# Patient Record
Sex: MALE | Race: BLACK OR AFRICAN AMERICAN | NOT HISPANIC OR LATINO | Employment: PART TIME | URBAN - METROPOLITAN AREA
[De-identification: names, ages, dates, MRNs, and addresses within clinical notes are randomized per-mention and may not be internally consistent; named-entity substitution may affect disease eponyms.]

---

## 2019-01-02 ENCOUNTER — OFFICE VISIT (OUTPATIENT)
Dept: FAMILY MEDICINE CLINIC | Facility: CLINIC | Age: 34
End: 2019-01-02

## 2019-01-02 VITALS
DIASTOLIC BLOOD PRESSURE: 80 MMHG | SYSTOLIC BLOOD PRESSURE: 130 MMHG | HEIGHT: 76 IN | RESPIRATION RATE: 16 BRPM | OXYGEN SATURATION: 98 % | TEMPERATURE: 98.2 F | HEART RATE: 78 BPM | WEIGHT: 244 LBS | BODY MASS INDEX: 29.71 KG/M2

## 2019-01-02 DIAGNOSIS — L30.9 ECZEMA, UNSPECIFIED TYPE: ICD-10-CM

## 2019-01-02 DIAGNOSIS — L70.0 CYSTIC ACNE: Primary | ICD-10-CM

## 2019-01-02 DIAGNOSIS — L20.9 ATOPIC DERMATITIS, UNSPECIFIED TYPE: ICD-10-CM

## 2019-01-02 DIAGNOSIS — Z00.00 ENCOUNTER FOR MEDICAL EXAMINATION TO ESTABLISH CARE: ICD-10-CM

## 2019-01-02 PROBLEM — K21.9 ACID REFLUX: Status: ACTIVE | Noted: 2019-01-02

## 2019-01-02 PROBLEM — K44.9 BERGMANN'S SYNDROME: Status: ACTIVE | Noted: 2019-01-02

## 2019-01-02 PROBLEM — S69.90XA FINGER INJURY: Status: ACTIVE | Noted: 2019-01-02

## 2019-01-02 PROBLEM — F17.200 NICOTINE ADDICTION: Status: ACTIVE | Noted: 2019-01-02

## 2019-01-02 PROCEDURE — 99203 OFFICE O/P NEW LOW 30 MIN: CPT | Performed by: NURSE PRACTITIONER

## 2019-01-02 RX ORDER — TRIAMCINOLONE ACETONIDE 1 MG/G
CREAM TOPICAL
COMMUNITY
Start: 2015-04-15 | End: 2022-10-26

## 2019-01-02 RX ORDER — IBUPROFEN 800 MG/1
1 TABLET ORAL EVERY 8 HOURS
COMMUNITY
Start: 2015-06-11 | End: 2022-10-26

## 2019-01-02 NOTE — PROGRESS NOTES
"Beto Dye is a 33 y.o. male who presents to establish care and skin problems.    Chief Complaint   Patient presents with   • Establish Care       Patient states that he has had cystic acne, atopic dermatitis, and eczema for many years. He has seen a dermatologist but would like a referral to a new one for a new opinion. He has breakouts frequently and would like options for different treatment. He took Accutane which helped for a while and steroid creams which also helped some. He has no other complaints at this time.          History reviewed. No pertinent past medical history.    History reviewed. No pertinent surgical history.    History reviewed. No pertinent family history.    Social History     Socioeconomic History   • Marital status: Single     Spouse name: Not on file   • Number of children: Not on file   • Years of education: Not on file   • Highest education level: Not on file   Social Needs   • Financial resource strain: Not on file   • Food insecurity - worry: Not on file   • Food insecurity - inability: Not on file   • Transportation needs - medical: Not on file   • Transportation needs - non-medical: Not on file   Occupational History   • Not on file   Tobacco Use   • Smoking status: Current Every Day Smoker     Types: Cigarettes   • Smokeless tobacco: Never Used   Substance and Sexual Activity   • Alcohol use: Not on file   • Drug use: Not on file   • Sexual activity: Not on file   Other Topics Concern   • Not on file   Social History Narrative   • Not on file       No Known Allergies    ROS    Review of Systems   Skin: Positive for color change, rash and skin lesions.   Allergic/Immunologic: Positive for environmental allergies and food allergies.   All other systems reviewed and are negative.      Vitals:    01/02/19 0911   BP: 130/80   Pulse: 78   Resp: 16   Temp: 98.2 °F (36.8 °C)   SpO2: 98%   Weight: 111 kg (244 lb)   Height: 193 cm (76\")   PainSc: 0-No pain         Current Outpatient " Medications:   •  ibuprofen (ADVIL,MOTRIN) 800 MG tablet, Take 1 tablet by mouth Every 8 (Eight) Hours., Disp: , Rfl:   •  triamcinolone (KENALOG) 0.1 % cream, Apply  topically to the appropriate area as directed., Disp: , Rfl:     PE    Physical Exam   Constitutional: He is oriented to person, place, and time. He appears well-developed and well-nourished.   HENT:   Head: Normocephalic and atraumatic.   Cardiovascular: Normal rate, regular rhythm and normal heart sounds. Exam reveals no gallop and no friction rub.   No murmur heard.  Pulmonary/Chest: Effort normal and breath sounds normal. No stridor. No respiratory distress. He has no wheezes. He has no rales.   Musculoskeletal: Normal range of motion.   Neurological: He is alert and oriented to person, place, and time.   Skin: Skin is warm and dry. Capillary refill takes less than 2 seconds. Turgor is normal. Rash noted. Rash is pustular and urticarial.        Psychiatric: He has a normal mood and affect. His behavior is normal. Judgment and thought content normal.   Nursing note and vitals reviewed.       A/P    Beto was seen today for establish care.    Diagnoses and all orders for this visit:    Cystic acne  -     Ambulatory Referral to Dermatology    Encounter for medical examination to establish care    Atopic dermatitis, unspecified type  -     Ambulatory Referral to Dermatology  -     Ambulatory Referral to Allergy    Eczema, unspecified type  -     Ambulatory Referral to Dermatology  -     Ambulatory Referral to Allergy        Plan of care reviewed with patient at the conclusion of today's visit. Education was provided regarding diagnosis, management and any prescribed or recommended OTC medications.  Patient verbalizes understanding of and agreement with management plan.    Return in about 4 weeks (around 1/30/2019) for Annual with fasting labs, Recheck.     Rita Boothe, APRN

## 2019-01-30 ENCOUNTER — OFFICE VISIT (OUTPATIENT)
Dept: FAMILY MEDICINE CLINIC | Facility: CLINIC | Age: 34
End: 2019-01-30

## 2019-01-30 VITALS
TEMPERATURE: 98 F | DIASTOLIC BLOOD PRESSURE: 80 MMHG | RESPIRATION RATE: 18 BRPM | WEIGHT: 243.6 LBS | HEART RATE: 78 BPM | OXYGEN SATURATION: 98 % | SYSTOLIC BLOOD PRESSURE: 150 MMHG | HEIGHT: 76 IN | BODY MASS INDEX: 29.67 KG/M2

## 2019-01-30 DIAGNOSIS — R10.13 EPIGASTRIC PAIN: ICD-10-CM

## 2019-01-30 DIAGNOSIS — R53.83 FATIGUE, UNSPECIFIED TYPE: ICD-10-CM

## 2019-01-30 DIAGNOSIS — H61.23 BILATERAL IMPACTED CERUMEN: ICD-10-CM

## 2019-01-30 DIAGNOSIS — R73.09 ELEVATED GLUCOSE: ICD-10-CM

## 2019-01-30 DIAGNOSIS — E78.5 HYPERLIPIDEMIA, UNSPECIFIED HYPERLIPIDEMIA TYPE: ICD-10-CM

## 2019-01-30 DIAGNOSIS — I10 ESSENTIAL HYPERTENSION: ICD-10-CM

## 2019-01-30 DIAGNOSIS — Z23 NEED FOR HEPATITIS A IMMUNIZATION: Primary | ICD-10-CM

## 2019-01-30 DIAGNOSIS — Z23 NEED FOR TETANUS BOOSTER: ICD-10-CM

## 2019-01-30 DIAGNOSIS — K21.9 GASTROESOPHAGEAL REFLUX DISEASE WITHOUT ESOPHAGITIS: ICD-10-CM

## 2019-01-30 PROCEDURE — 99395 PREV VISIT EST AGE 18-39: CPT | Performed by: NURSE PRACTITIONER

## 2019-01-30 PROCEDURE — 90632 HEPA VACCINE ADULT IM: CPT | Performed by: NURSE PRACTITIONER

## 2019-01-30 PROCEDURE — 90471 IMMUNIZATION ADMIN: CPT | Performed by: NURSE PRACTITIONER

## 2019-01-30 PROCEDURE — 90472 IMMUNIZATION ADMIN EACH ADD: CPT | Performed by: NURSE PRACTITIONER

## 2019-01-30 PROCEDURE — 90715 TDAP VACCINE 7 YRS/> IM: CPT | Performed by: NURSE PRACTITIONER

## 2019-01-30 RX ORDER — BECLOMETHASONE DIPROPIONATE HFA 80 UG/1
AEROSOL, METERED RESPIRATORY (INHALATION)
Refills: 1 | COMMUNITY
Start: 2019-01-11 | End: 2022-10-26

## 2019-01-30 RX ORDER — OMEPRAZOLE 20 MG/1
20 CAPSULE, DELAYED RELEASE ORAL DAILY
Qty: 30 CAPSULE | Refills: 2 | Status: SHIPPED | OUTPATIENT
Start: 2019-01-30 | End: 2022-10-26

## 2019-01-30 RX ORDER — DOXYCYCLINE HYCLATE 100 MG/1
CAPSULE ORAL
Refills: 0 | COMMUNITY
Start: 2019-01-11 | End: 2019-02-27

## 2019-01-30 RX ORDER — CLINDAMYCIN PHOSPHATE 11.9 MG/ML
SOLUTION TOPICAL
Refills: 0 | COMMUNITY
Start: 2019-01-11 | End: 2022-10-26

## 2019-01-30 RX ORDER — INHALER,ASSIST DEVICE,LG MASK
SPACER (EA) MISCELLANEOUS SEE ADMIN INSTRUCTIONS
Refills: 0 | COMMUNITY
Start: 2019-01-11 | End: 2022-10-26

## 2019-01-30 RX ORDER — FLUTICASONE PROPIONATE 50 UG/1
SPRAY, METERED NASAL
Refills: 1 | COMMUNITY
Start: 2019-01-11 | End: 2022-10-26

## 2019-01-30 RX ORDER — TACROLIMUS 0.3 MG/G
OINTMENT TOPICAL
Refills: 0 | COMMUNITY
Start: 2019-01-14 | End: 2022-10-26

## 2019-01-30 RX ORDER — AMLODIPINE BESYLATE 5 MG/1
5 TABLET ORAL DAILY
Qty: 30 TABLET | Refills: 2 | Status: SHIPPED | OUTPATIENT
Start: 2019-01-30 | End: 2022-10-26

## 2019-01-31 LAB
ALBUMIN SERPL-MCNC: 4.73 G/DL (ref 3.2–4.8)
ALBUMIN/GLOB SERPL: 1.8 G/DL (ref 1.5–2.5)
ALP SERPL-CCNC: 68 U/L (ref 25–100)
ALT SERPL-CCNC: 51 U/L (ref 7–40)
AST SERPL-CCNC: 34 U/L (ref 0–33)
BASOPHILS # BLD AUTO: 0.02 10*3/MM3 (ref 0–0.2)
BASOPHILS NFR BLD AUTO: 0.4 % (ref 0–1)
BILIRUB SERPL-MCNC: 0.7 MG/DL (ref 0.3–1.2)
BUN SERPL-MCNC: 14 MG/DL (ref 9–23)
BUN/CREAT SERPL: 12.5 (ref 7–25)
CALCIUM SERPL-MCNC: 9.8 MG/DL (ref 8.7–10.4)
CHLORIDE SERPL-SCNC: 103 MMOL/L (ref 99–109)
CHOLEST SERPL-MCNC: 250 MG/DL (ref 0–200)
CHOLEST/HDLC SERPL: 6.58 {RATIO}
CO2 SERPL-SCNC: 28 MMOL/L (ref 20–31)
CREAT SERPL-MCNC: 1.12 MG/DL (ref 0.6–1.3)
EOSINOPHIL # BLD AUTO: 0.21 10*3/MM3 (ref 0–0.3)
EOSINOPHIL NFR BLD AUTO: 4.1 % (ref 0–3)
ERYTHROCYTE [DISTWIDTH] IN BLOOD BY AUTOMATED COUNT: 13.4 % (ref 11.3–14.5)
GLOBULIN SER CALC-MCNC: 2.7 GM/DL
GLUCOSE SERPL-MCNC: 89 MG/DL (ref 70–100)
H PYLORI IGA SER-ACNC: <9 UNITS (ref 0–8.9)
H PYLORI IGG SER IA-ACNC: 0.24 INDEX VALUE (ref 0–0.79)
H PYLORI IGM SER-ACNC: <9 UNITS (ref 0–8.9)
HCT VFR BLD AUTO: 52.4 % (ref 38.9–50.9)
HDLC SERPL-MCNC: 38 MG/DL (ref 40–60)
HGB BLD-MCNC: 17.6 G/DL (ref 13.1–17.5)
IMM GRANULOCYTES # BLD AUTO: 0.02 10*3/MM3 (ref 0–0.03)
IMM GRANULOCYTES NFR BLD AUTO: 0.4 % (ref 0–0.6)
LDLC SERPL CALC-MCNC: 185 MG/DL (ref 0–100)
LYMPHOCYTES # BLD AUTO: 2.01 10*3/MM3 (ref 0.6–4.8)
LYMPHOCYTES NFR BLD AUTO: 39.6 % (ref 24–44)
MCH RBC QN AUTO: 30.3 PG (ref 27–31)
MCHC RBC AUTO-ENTMCNC: 33.6 G/DL (ref 32–36)
MCV RBC AUTO: 90.3 FL (ref 80–99)
MONOCYTES # BLD AUTO: 0.61 10*3/MM3 (ref 0–1)
MONOCYTES NFR BLD AUTO: 12 % (ref 0–12)
NEUTROPHILS # BLD AUTO: 2.21 10*3/MM3 (ref 1.5–8.3)
NEUTROPHILS NFR BLD AUTO: 43.5 % (ref 41–71)
PLATELET # BLD AUTO: 306 10*3/MM3 (ref 150–450)
POTASSIUM SERPL-SCNC: 4.3 MMOL/L (ref 3.5–5.5)
PROT SERPL-MCNC: 7.4 G/DL (ref 5.7–8.2)
RBC # BLD AUTO: 5.8 10*6/MM3 (ref 4.2–5.76)
SODIUM SERPL-SCNC: 134 MMOL/L (ref 132–146)
TRIGL SERPL-MCNC: 137 MG/DL (ref 0–150)
TSH SERPL DL<=0.005 MIU/L-ACNC: 1.98 MIU/ML (ref 0.35–5.35)
VLDLC SERPL CALC-MCNC: 27.4 MG/DL
WBC # BLD AUTO: 5.08 10*3/MM3 (ref 3.5–10.8)

## 2019-02-22 PROBLEM — E78.49 OTHER HYPERLIPIDEMIA: Status: ACTIVE | Noted: 2019-02-22

## 2019-02-22 RX ORDER — ATORVASTATIN CALCIUM 20 MG/1
20 TABLET, FILM COATED ORAL NIGHTLY
Qty: 30 TABLET | Refills: 5 | Status: SHIPPED | OUTPATIENT
Start: 2019-02-22 | End: 2022-10-26

## 2019-02-27 ENCOUNTER — OFFICE VISIT (OUTPATIENT)
Dept: FAMILY MEDICINE CLINIC | Facility: CLINIC | Age: 34
End: 2019-02-27

## 2019-02-27 VITALS
RESPIRATION RATE: 16 BRPM | SYSTOLIC BLOOD PRESSURE: 132 MMHG | TEMPERATURE: 98.5 F | HEART RATE: 70 BPM | DIASTOLIC BLOOD PRESSURE: 90 MMHG | HEIGHT: 76 IN | WEIGHT: 248.2 LBS | OXYGEN SATURATION: 98 % | BODY MASS INDEX: 30.22 KG/M2

## 2019-02-27 DIAGNOSIS — J45.20 MILD INTERMITTENT ASTHMA, UNSPECIFIED WHETHER COMPLICATED: Primary | ICD-10-CM

## 2019-02-27 DIAGNOSIS — H66.93 BILATERAL ACUTE OTITIS MEDIA: ICD-10-CM

## 2019-02-27 DIAGNOSIS — J06.9 ACUTE URI: ICD-10-CM

## 2019-02-27 PROCEDURE — 99213 OFFICE O/P EST LOW 20 MIN: CPT | Performed by: NURSE PRACTITIONER

## 2019-02-27 RX ORDER — AMOXICILLIN AND CLAVULANATE POTASSIUM 875; 125 MG/1; MG/1
1 TABLET, FILM COATED ORAL 2 TIMES DAILY
Qty: 20 TABLET | Refills: 0 | Status: SHIPPED | OUTPATIENT
Start: 2019-02-27 | End: 2019-03-09

## 2019-02-27 RX ORDER — ALBUTEROL SULFATE 90 UG/1
2 AEROSOL, METERED RESPIRATORY (INHALATION) EVERY 4 HOURS PRN
Qty: 1 INHALER | Refills: 11 | Status: SHIPPED | OUTPATIENT
Start: 2019-02-27

## 2019-02-27 RX ORDER — CETIRIZINE HYDROCHLORIDE, PSEUDOEPHEDRINE HYDROCHLORIDE 5; 120 MG/1; MG/1
1 TABLET, FILM COATED, EXTENDED RELEASE ORAL 2 TIMES DAILY
Qty: 20 TABLET | Refills: 0 | Status: SHIPPED | OUTPATIENT
Start: 2019-02-27 | End: 2019-03-09

## 2019-02-27 NOTE — PROGRESS NOTES
Beto Dye is a 33 y.o. male who presents for ear pain and sore throat.    Chief Complaint   Patient presents with   • Ear Drainage       Ear Drainage    There is pain in both ears. This is a new problem. The current episode started in the past 7 days. The problem occurs constantly. The problem has been gradually worsening. There has been no fever. The pain is at a severity of 3/10. The pain is mild. Associated symptoms include rhinorrhea and a sore throat. He has tried NSAIDs and acetaminophen for the symptoms. The treatment provided mild relief.         History reviewed. No pertinent past medical history.    History reviewed. No pertinent surgical history.    Family History   Problem Relation Age of Onset   • Hypertension Mother        Social History     Socioeconomic History   • Marital status: Single     Spouse name: Not on file   • Number of children: Not on file   • Years of education: Not on file   • Highest education level: Not on file   Social Needs   • Financial resource strain: Not on file   • Food insecurity - worry: Not on file   • Food insecurity - inability: Not on file   • Transportation needs - medical: Not on file   • Transportation needs - non-medical: Not on file   Occupational History   • Not on file   Tobacco Use   • Smoking status: Current Some Day Smoker     Types: Cigarettes   • Smokeless tobacco: Never Used   Substance and Sexual Activity   • Alcohol use: No     Frequency: Never   • Drug use: No   • Sexual activity: Not on file   Other Topics Concern   • Not on file   Social History Narrative   • Not on file       No Known Allergies    ROS    Review of Systems   Constitutional: Positive for fatigue.   HENT: Positive for congestion, ear pain, postnasal drip, rhinorrhea and sore throat.    All other systems reviewed and are negative.      Vitals:    02/27/19 1514   BP: 132/90   Pulse: 70   Resp: 16   Temp: 98.5 °F (36.9 °C)   TempSrc: Oral   SpO2: 98%   Weight: 113 kg (248 lb 3.2 oz)  "  Height: 193 cm (76\")   PainSc: 0-No pain         Current Outpatient Medications:   •  amLODIPine (NORVASC) 5 MG tablet, Take 1 tablet by mouth Daily., Disp: 30 tablet, Rfl: 2  •  atorvastatin (LIPITOR) 20 MG tablet, Take 1 tablet by mouth Every Night., Disp: 30 tablet, Rfl: 5  •  clindamycin (CLEOCIN T) 1 % external solution, apply to affected area every morning, Disp: , Rfl: 0  •  GNP FLUTICASONE PROPIONATE 50 MCG/ACT nasal spray, instill 1 spray into each nostril once daily to twice a day, Disp: , Rfl: 1  •  ibuprofen (ADVIL,MOTRIN) 800 MG tablet, Take 1 tablet by mouth Every 8 (Eight) Hours., Disp: , Rfl:   •  omeprazole (PRILOSEC) 20 MG capsule, Take 1 capsule by mouth Daily., Disp: 30 capsule, Rfl: 2  •  QVAR REDIHALER 80 MCG/ACT inhaler, inhale 2 puffs by mouth twice a day WITH SPACER, Disp: , Rfl: 1  •  Spacer/Aero-Holding Chambers (AEROCHAMBER PLUS GAURANG-VU LARGE) misc, Inhale See Admin Instructions. with inhaler, Disp: , Rfl: 0  •  tacrolimus (PROTOPIC) 0.03 % ointment, , Disp: , Rfl: 0  •  triamcinolone (KENALOG) 0.1 % cream, Apply  topically to the appropriate area as directed., Disp: , Rfl:   •  albuterol sulfate  (90 Base) MCG/ACT inhaler, Inhale 2 puffs Every 4 (Four) Hours As Needed for Wheezing., Disp: 1 inhaler, Rfl: 11  •  amoxicillin-clavulanate (AUGMENTIN) 875-125 MG per tablet, Take 1 tablet by mouth 2 (Two) Times a Day for 10 days., Disp: 20 tablet, Rfl: 0  •  cetirizine-pseudoephedrine (ZyrTEC-D) 5-120 MG per 12 hr tablet, Take 1 tablet by mouth 2 (Two) Times a Day for 10 days., Disp: 20 tablet, Rfl: 0    PE    Physical Exam   Constitutional: He is oriented to person, place, and time. He appears well-developed and well-nourished.   HENT:   Head: Normocephalic and atraumatic.   Right Ear: External ear and ear canal normal. Tympanic membrane is erythematous and bulging. A middle ear effusion is present.   Left Ear: External ear and ear canal normal. Tympanic membrane is erythematous and " bulging. A middle ear effusion is present.   Nose: Mucosal edema, rhinorrhea and congestion present.   Mouth/Throat: Posterior oropharyngeal edema and posterior oropharyngeal erythema present. Tonsils are 2+ on the right. Tonsils are 2+ on the left.   Cardiovascular: Normal rate, regular rhythm and normal heart sounds. Exam reveals no gallop and no friction rub.   No murmur heard.  Pulmonary/Chest: Effort normal. No stridor. No respiratory distress. He has decreased breath sounds. He has no wheezes. He has no rales.   Musculoskeletal: Normal range of motion.   Neurological: He is alert and oriented to person, place, and time.   Skin: Skin is warm and dry. Capillary refill takes less than 2 seconds.   Psychiatric: He has a normal mood and affect. His behavior is normal. Judgment and thought content normal.   Nursing note and vitals reviewed.       A/P    Beto was seen today for ear drainage.    Diagnoses and all orders for this visit:    Mild intermittent asthma, unspecified whether complicated  -     albuterol sulfate  (90 Base) MCG/ACT inhaler; Inhale 2 puffs Every 4 (Four) Hours As Needed for Wheezing.    Bilateral acute otitis media  -     amoxicillin-clavulanate (AUGMENTIN) 875-125 MG per tablet; Take 1 tablet by mouth 2 (Two) Times a Day for 10 days.    Acute URI  -     cetirizine-pseudoephedrine (ZyrTEC-D) 5-120 MG per 12 hr tablet; Take 1 tablet by mouth 2 (Two) Times a Day for 10 days.        Plan of care reviewed with patient at the conclusion of today's visit. Education was provided regarding diagnosis, management and any prescribed or recommended OTC medications.  Patient verbalizes understanding of and agreement with management plan.    Return if symptoms worsen or fail to improve.     Rita Boothe, APRN

## 2019-07-01 RX ORDER — OMEPRAZOLE 20 MG/1
CAPSULE, DELAYED RELEASE ORAL
Qty: 30 CAPSULE | Refills: 2 | OUTPATIENT
Start: 2019-07-01

## 2019-07-01 RX ORDER — AMLODIPINE BESYLATE 5 MG/1
TABLET ORAL
Qty: 30 TABLET | Refills: 2 | OUTPATIENT
Start: 2019-07-01

## 2022-09-14 ENCOUNTER — TELEPHONE (OUTPATIENT)
Dept: PEDIATRICS | Facility: OTHER | Age: 37
End: 2022-09-14

## 2022-09-14 NOTE — TELEPHONE ENCOUNTER
Caller: Thompson Dye    Relationship to patient: Self    Best call back number: [phone]    Chief complaint:   PATIENT STATED THAT HE HAS BEEN HAVING ABDOMINAL PAIN WITH BLACK STOOL AND BRIGHT RED BLOOD IN STOOL. PATIENT WAS INFORMED TO GO TO THE ER OR CALL 911. PATIENT STATED THAT HE WAS GOING TO BE SEEN AT THE HOSPITAL     Patient directed to call 911 or go to their nearest emergency room.     Patient verbalized understanding: [x] Yes  [] No  If no, why?    NEW PATIENT NO PCP AT THIS TIME

## 2022-10-26 ENCOUNTER — OFFICE VISIT (OUTPATIENT)
Dept: FAMILY MEDICINE CLINIC | Facility: CLINIC | Age: 37
End: 2022-10-26

## 2022-10-26 VITALS
SYSTOLIC BLOOD PRESSURE: 130 MMHG | HEART RATE: 91 BPM | BODY MASS INDEX: 32.73 KG/M2 | WEIGHT: 268.8 LBS | OXYGEN SATURATION: 98 % | DIASTOLIC BLOOD PRESSURE: 68 MMHG | HEIGHT: 76 IN | TEMPERATURE: 98.7 F

## 2022-10-26 DIAGNOSIS — R10.9 ABDOMINAL WALL PAIN: ICD-10-CM

## 2022-10-26 DIAGNOSIS — K22.11 ULCER OF ESOPHAGUS WITH BLEEDING: ICD-10-CM

## 2022-10-26 DIAGNOSIS — F17.210 CIGARETTE NICOTINE DEPENDENCE WITHOUT COMPLICATION: ICD-10-CM

## 2022-10-26 DIAGNOSIS — K20.90 ESOPHAGITIS: ICD-10-CM

## 2022-10-26 PROBLEM — S69.90XA FINGER INJURY: Status: RESOLVED | Noted: 2019-01-02 | Resolved: 2022-10-26

## 2022-10-26 PROBLEM — J45.40 MODERATE PERSISTENT ASTHMA WITHOUT COMPLICATION: Status: ACTIVE | Noted: 2022-10-26

## 2022-10-26 LAB
BILIRUB BLD-MCNC: NEGATIVE MG/DL
CLARITY, POC: CLEAR
COLOR UR: YELLOW
EXPIRATION DATE: ABNORMAL
GLUCOSE UR STRIP-MCNC: NEGATIVE MG/DL
KETONES UR QL: NEGATIVE
LEUKOCYTE EST, POC: NEGATIVE
Lab: ABNORMAL
NITRITE UR-MCNC: NEGATIVE MG/ML
PH UR: 6 [PH] (ref 5–8)
PROT UR STRIP-MCNC: NEGATIVE MG/DL
RBC # UR STRIP: NEGATIVE /UL
SP GR UR: 1.03 (ref 1–1.03)
UROBILINOGEN UR QL: NORMAL

## 2022-10-26 PROCEDURE — 99406 BEHAV CHNG SMOKING 3-10 MIN: CPT | Performed by: STUDENT IN AN ORGANIZED HEALTH CARE EDUCATION/TRAINING PROGRAM

## 2022-10-26 PROCEDURE — 99203 OFFICE O/P NEW LOW 30 MIN: CPT | Performed by: STUDENT IN AN ORGANIZED HEALTH CARE EDUCATION/TRAINING PROGRAM

## 2022-10-26 RX ORDER — METHOCARBAMOL 750 MG/1
750 TABLET, FILM COATED ORAL 2 TIMES DAILY PRN
Qty: 30 TABLET | Refills: 0 | Status: SHIPPED | OUTPATIENT
Start: 2022-10-26

## 2022-10-26 RX ORDER — NICOTINE 21 MG/24HR
1 PATCH, TRANSDERMAL 24 HOURS TRANSDERMAL EVERY 24 HOURS
Qty: 28 EACH | Refills: 1 | Status: SHIPPED | OUTPATIENT
Start: 2022-10-26 | End: 2023-01-26 | Stop reason: SDUPTHER

## 2022-10-26 RX ORDER — DEXAMETHASONE 4 MG/1
TABLET ORAL
COMMUNITY
Start: 2022-10-25

## 2022-10-26 RX ORDER — PANTOPRAZOLE SODIUM 40 MG/1
40 TABLET, DELAYED RELEASE ORAL EVERY 12 HOURS
COMMUNITY
Start: 2022-09-29

## 2022-10-26 RX ORDER — DUPILUMAB 300 MG/2ML
INJECTION, SOLUTION SUBCUTANEOUS
COMMUNITY
Start: 2022-10-25

## 2022-10-26 RX ORDER — SUCRALFATE 1 G/1
1 TABLET ORAL 2 TIMES DAILY
COMMUNITY
Start: 2022-09-15

## 2022-10-26 NOTE — PROGRESS NOTES
"  Established Patient Office Visit        Subjective      Chief Complaint:  Annual Exam (EST CARE/) and Abdominal Pain (Going on week )      History of Present Illness: Thompson Dye is a 37 y.o. male who presents for abdominal pain     1 week of LLQ pain worse with movement. No blood in stool . IT is improving.     About 3 weeks ago patient has epigastric pain and blood in stool and had EGD And colonoscopy and found to have esophageal ulcer.         Patient Active Problem List   Diagnosis   • Acid reflux   • Apple's syndrome   • Acne cystica   • AD (atopic dermatitis)   • Nicotine addiction   • Other hyperlipidemia   • Ulcer of esophagus with bleeding   • Esophagitis   • Moderate persistent asthma without complication         Current Outpatient Medications:   •  Dupixent 300 MG/2ML solution pen-injector, , Disp: , Rfl:   •  Flovent  MCG/ACT inhaler, , Disp: , Rfl:   •  pantoprazole (PROTONIX) 40 MG EC tablet, Take 1 tablet by mouth Every 12 (Twelve) Hours., Disp: , Rfl:   •  sucralfate (CARAFATE) 1 g tablet, Take 1 tablet by mouth 2 (Two) Times a Day., Disp: , Rfl:   •  albuterol sulfate  (90 Base) MCG/ACT inhaler, Inhale 2 puffs Every 4 (Four) Hours As Needed for Wheezing., Disp: 1 inhaler, Rfl: 11  •  methocarbamol (ROBAXIN) 750 MG tablet, Take 1 tablet by mouth 2 (Two) Times a Day As Needed for Muscle Spasms., Disp: 30 tablet, Rfl: 0  •  nicotine (NICODERM CQ) 14 MG/24HR patch, Place 1 patch on the skin as directed by provider Daily., Disp: 28 each, Rfl: 1  •  nicotine (NICODERM CQ) 7 MG/24HR patch, Place 1 patch on the skin as directed by provider Daily. Start after 14 mg patches, Disp: 28 each, Rfl: 1       Objective     Physical Exam:   Vital Signs:   /68   Pulse 91   Temp 98.7 °F (37.1 °C)   Ht 193 cm (76\")   Wt 122 kg (268 lb 12.8 oz)   SpO2 98%   BMI 32.72 kg/m²      Physical Exam  Constitutional:       General: He is not in acute distress.     Appearance: He is not " ill-appearing.   Cardiovascular:      Rate and Rhythm: Normal rate and regular rhythm.   Pulmonary:      Effort: Pulmonary effort is normal.      Breath sounds: Normal breath sounds.   Neurological:      Mental Status: He is alert.   Psychiatric:         Thought Content: Thought content normal.   No epigastric tenderness no mass  Localized tenderness to the rectus abdominis inferior to umbilicus.  No hernia felt.         Assessment / Plan      Assessment/Plan:   Diagnoses and all orders for this visit:    1. Abdominal wall pain (Primary)  -     methocarbamol (ROBAXIN) 750 MG tablet; Take 1 tablet by mouth 2 (Two) Times a Day As Needed for Muscle Spasms.  Dispense: 30 tablet; Refill: 0  -call if worsening     2. Esophagitis  3. Ulcer of esophagus with bleeding  -Continue PPI twice daily and Carafate.  Follow-up with GI.  Avoid NSAIDs  -EGD and colonoscopy report requested    5. Cigarette nicotine dependence without complication    I have educated the patient on the risk of diseases from using tobacco products including lung disease, cancers, and vascular disease. I advised the patient to quit. Smoking cessation handout provided.  We discussed cessation options including cold turkey, taper, nicotine replacement versus prescription options.  Patient quit date is 01/01/2023.  Patient will return in less than 3 months to evaluate progress. I spent 5 minutes counseling the patient.    Orders:  -     nicotine (NICODERM CQ) 14 MG/24HR patch; Place 1 patch on the skin as directed by provider Daily.  Dispense: 28 each; Refill: 1  -     nicotine (NICODERM CQ) 7 MG/24HR patch; Place 1 patch on the skin as directed by provider Daily. Start after 14 mg patches  Dispense: 28 each; Refill: 1    Asthma  -Continue Flovent albuterol and follows with allergist  Point-of-care UA normal.     Follow Up:   Return in about 3 months (around 1/26/2023) for Wellness visit.      MDM:     Andrew Edwards MD  Family Medicine - [facility]

## 2023-01-26 ENCOUNTER — OFFICE VISIT (OUTPATIENT)
Dept: FAMILY MEDICINE CLINIC | Facility: CLINIC | Age: 38
End: 2023-01-26
Payer: MEDICAID

## 2023-01-26 ENCOUNTER — LAB (OUTPATIENT)
Dept: LAB | Facility: HOSPITAL | Age: 38
End: 2023-01-26
Payer: MEDICAID

## 2023-01-26 VITALS
HEART RATE: 80 BPM | SYSTOLIC BLOOD PRESSURE: 118 MMHG | HEIGHT: 76 IN | TEMPERATURE: 97.7 F | WEIGHT: 253 LBS | OXYGEN SATURATION: 96 % | RESPIRATION RATE: 21 BRPM | DIASTOLIC BLOOD PRESSURE: 70 MMHG | BODY MASS INDEX: 30.81 KG/M2

## 2023-01-26 DIAGNOSIS — J45.40 MODERATE PERSISTENT ASTHMA WITHOUT COMPLICATION: ICD-10-CM

## 2023-01-26 DIAGNOSIS — Z00.00 ENCOUNTER FOR ROUTINE ADULT HEALTH EXAMINATION WITHOUT ABNORMAL FINDINGS: Primary | ICD-10-CM

## 2023-01-26 DIAGNOSIS — K20.90 ESOPHAGITIS: ICD-10-CM

## 2023-01-26 DIAGNOSIS — F17.210 CIGARETTE NICOTINE DEPENDENCE WITHOUT COMPLICATION: ICD-10-CM

## 2023-01-26 LAB
ALBUMIN SERPL-MCNC: 4.5 G/DL (ref 3.5–5.2)
ALBUMIN/GLOB SERPL: 1.6 G/DL
ALP SERPL-CCNC: 78 U/L (ref 39–117)
ALT SERPL W P-5'-P-CCNC: 47 U/L (ref 1–41)
ANION GAP SERPL CALCULATED.3IONS-SCNC: 9.1 MMOL/L (ref 5–15)
AST SERPL-CCNC: 37 U/L (ref 1–40)
BASOPHILS # BLD AUTO: 0.01 10*3/MM3 (ref 0–0.2)
BASOPHILS NFR BLD AUTO: 0.2 % (ref 0–1.5)
BILIRUB SERPL-MCNC: 0.9 MG/DL (ref 0–1.2)
BUN SERPL-MCNC: 14 MG/DL (ref 6–20)
BUN/CREAT SERPL: 12.8 (ref 7–25)
CALCIUM SPEC-SCNC: 9.5 MG/DL (ref 8.6–10.5)
CHLORIDE SERPL-SCNC: 106 MMOL/L (ref 98–107)
CHOLEST SERPL-MCNC: 235 MG/DL (ref 0–200)
CO2 SERPL-SCNC: 24.9 MMOL/L (ref 22–29)
CREAT SERPL-MCNC: 1.09 MG/DL (ref 0.76–1.27)
DEPRECATED RDW RBC AUTO: 43.2 FL (ref 37–54)
EGFRCR SERPLBLD CKD-EPI 2021: 89.6 ML/MIN/1.73
EOSINOPHIL # BLD AUTO: 0.09 10*3/MM3 (ref 0–0.4)
EOSINOPHIL NFR BLD AUTO: 2.2 % (ref 0.3–6.2)
ERYTHROCYTE [DISTWIDTH] IN BLOOD BY AUTOMATED COUNT: 13.4 % (ref 12.3–15.4)
GLOBULIN UR ELPH-MCNC: 2.9 GM/DL
GLUCOSE SERPL-MCNC: 88 MG/DL (ref 65–99)
HBA1C MFR BLD: 5.7 % (ref 4.8–5.6)
HCT VFR BLD AUTO: 49.4 % (ref 37.5–51)
HDLC SERPL-MCNC: 34 MG/DL (ref 40–60)
HGB BLD-MCNC: 16.5 G/DL (ref 13–17.7)
IMM GRANULOCYTES # BLD AUTO: 0.02 10*3/MM3 (ref 0–0.05)
IMM GRANULOCYTES NFR BLD AUTO: 0.5 % (ref 0–0.5)
LDLC SERPL CALC-MCNC: 182 MG/DL (ref 0–100)
LDLC/HDLC SERPL: 5.29 {RATIO}
LYMPHOCYTES # BLD AUTO: 1.86 10*3/MM3 (ref 0.7–3.1)
LYMPHOCYTES NFR BLD AUTO: 44.7 % (ref 19.6–45.3)
MCH RBC QN AUTO: 29.6 PG (ref 26.6–33)
MCHC RBC AUTO-ENTMCNC: 33.4 G/DL (ref 31.5–35.7)
MCV RBC AUTO: 88.7 FL (ref 79–97)
MONOCYTES # BLD AUTO: 0.5 10*3/MM3 (ref 0.1–0.9)
MONOCYTES NFR BLD AUTO: 12 % (ref 5–12)
NEUTROPHILS NFR BLD AUTO: 1.68 10*3/MM3 (ref 1.7–7)
NEUTROPHILS NFR BLD AUTO: 40.4 % (ref 42.7–76)
NRBC BLD AUTO-RTO: 0 /100 WBC (ref 0–0.2)
PLATELET # BLD AUTO: 299 10*3/MM3 (ref 140–450)
PMV BLD AUTO: 9.2 FL (ref 6–12)
POTASSIUM SERPL-SCNC: 4.3 MMOL/L (ref 3.5–5.2)
PROT SERPL-MCNC: 7.4 G/DL (ref 6–8.5)
RBC # BLD AUTO: 5.57 10*6/MM3 (ref 4.14–5.8)
SODIUM SERPL-SCNC: 140 MMOL/L (ref 136–145)
TRIGL SERPL-MCNC: 106 MG/DL (ref 0–150)
VLDLC SERPL-MCNC: 19 MG/DL (ref 5–40)
WBC NRBC COR # BLD: 4.16 10*3/MM3 (ref 3.4–10.8)

## 2023-01-26 PROCEDURE — 83036 HEMOGLOBIN GLYCOSYLATED A1C: CPT | Performed by: STUDENT IN AN ORGANIZED HEALTH CARE EDUCATION/TRAINING PROGRAM

## 2023-01-26 PROCEDURE — 85025 COMPLETE CBC W/AUTO DIFF WBC: CPT | Performed by: STUDENT IN AN ORGANIZED HEALTH CARE EDUCATION/TRAINING PROGRAM

## 2023-01-26 PROCEDURE — 90471 IMMUNIZATION ADMIN: CPT | Performed by: STUDENT IN AN ORGANIZED HEALTH CARE EDUCATION/TRAINING PROGRAM

## 2023-01-26 PROCEDURE — 99406 BEHAV CHNG SMOKING 3-10 MIN: CPT | Performed by: STUDENT IN AN ORGANIZED HEALTH CARE EDUCATION/TRAINING PROGRAM

## 2023-01-26 PROCEDURE — 99395 PREV VISIT EST AGE 18-39: CPT | Performed by: STUDENT IN AN ORGANIZED HEALTH CARE EDUCATION/TRAINING PROGRAM

## 2023-01-26 PROCEDURE — 80061 LIPID PANEL: CPT | Performed by: STUDENT IN AN ORGANIZED HEALTH CARE EDUCATION/TRAINING PROGRAM

## 2023-01-26 PROCEDURE — 3008F BODY MASS INDEX DOCD: CPT | Performed by: STUDENT IN AN ORGANIZED HEALTH CARE EDUCATION/TRAINING PROGRAM

## 2023-01-26 PROCEDURE — 80053 COMPREHEN METABOLIC PANEL: CPT | Performed by: STUDENT IN AN ORGANIZED HEALTH CARE EDUCATION/TRAINING PROGRAM

## 2023-01-26 PROCEDURE — 90677 PCV20 VACCINE IM: CPT | Performed by: STUDENT IN AN ORGANIZED HEALTH CARE EDUCATION/TRAINING PROGRAM

## 2023-01-26 PROCEDURE — 2014F MENTAL STATUS ASSESS: CPT | Performed by: STUDENT IN AN ORGANIZED HEALTH CARE EDUCATION/TRAINING PROGRAM

## 2023-01-26 RX ORDER — NICOTINE 21 MG/24HR
1 PATCH, TRANSDERMAL 24 HOURS TRANSDERMAL EVERY 24 HOURS
Qty: 28 EACH | Refills: 1 | Status: SHIPPED | OUTPATIENT
Start: 2023-01-26

## 2023-01-26 RX ORDER — POLYETHYLENE GLYCOL 3350 17 G
2 POWDER IN PACKET (EA) ORAL AS NEEDED
Qty: 48 LOZENGE | Refills: 5 | Status: SHIPPED | OUTPATIENT
Start: 2023-01-26

## 2023-01-26 NOTE — PROGRESS NOTES
Male Physical Note      Patient Name: Thompson Dye  : 1985   MRN: [id number]     Subjective    Subjective     Chief Complaint:    Chief Complaint   Patient presents with   • Annual Exam       History of Present Illness: Thompson Dye is a 37 y.o. male who is here today for their annual health maintenance and physical.      Past Medical History, Social History, Family History and Care Team were all reviewed with patient and updated as appropriate.     Medications:     Current Outpatient Medications:   •  albuterol sulfate  (90 Base) MCG/ACT inhaler, Inhale 2 puffs Every 4 (Four) Hours As Needed for Wheezing., Disp: 1 inhaler, Rfl: 11  •  Dupixent 300 MG/2ML solution pen-injector, , Disp: , Rfl:   •  Flovent  MCG/ACT inhaler, , Disp: , Rfl:   •  methocarbamol (ROBAXIN) 750 MG tablet, Take 1 tablet by mouth 2 (Two) Times a Day As Needed for Muscle Spasms., Disp: 30 tablet, Rfl: 0  •  nicotine (NICODERM CQ) 14 MG/24HR patch, Place 1 patch on the skin as directed by provider Daily., Disp: 28 each, Rfl: 1  •  nicotine (NICODERM CQ) 7 MG/24HR patch, Place 1 patch on the skin as directed by provider Daily. Start after 14 mg patches, Disp: 28 each, Rfl: 1  •  pantoprazole (PROTONIX) 40 MG EC tablet, Take 1 tablet by mouth Every 12 (Twelve) Hours., Disp: , Rfl:   •  sucralfate (CARAFATE) 1 g tablet, Take 1 tablet by mouth 2 (Two) Times a Day., Disp: , Rfl:   •  nicotine polacrilex ([employer] Nicotine) 2 MG lozenge, Dissolve 1 lozenge in the mouth As Needed for Smoking Cessation. Do not use more than 12 per day, Disp: 48 lozenge, Rfl: 5    Allergies:   No Known Allergies    Immunizations:  Td/Tdap(Booster Q 10 yrs):  uptodate   Flu (Yearly):  Declines       Diet/Physical activity: decent diet. No exercise except some at work.     Depression: PHQ-2 Depression Screening  Little interest or pleasure in doing things? 0-->not at all   Feeling down, depressed, or hopeless? 0-->not at all   PHQ-2 Total Score  "0       Objective   Objective     Physical Exam:  Vital Signs:   Vitals:    01/26/23 0734   BP: 118/70   Pulse: 80   Resp: 21   Temp: 97.7 °F (36.5 °C)   SpO2: 96%   Weight: 115 kg (253 lb)   Height: 193 cm (76\")   PainSc: 0-No pain     Body mass index is 30.8 kg/m².     Physical Exam    Procedures    Assessment / Plan      Assessment/Plan:   Diagnoses and all orders for this visit:    1. Encounter for routine adult health examination without abnormal findings (Primary)  -     CBC & Differential  -     Comprehensive Metabolic Panel  -     Lipid Panel  -     Hemoglobin A1c  -     Pneumococcal Conjugate Vaccine 20-Valent (PCV20)    2. Esophagitis  Assessment & Plan:  Continue PPI    Orders:  -     CBC & Differential    3. Cigarette nicotine dependence without complication  -     nicotine (NICODERM CQ) 14 MG/24HR patch; Place 1 patch on the skin as directed by provider Daily.  Dispense: 28 each; Refill: 1  -     nicotine polacrilex ([employer] Nicotine) 2 MG lozenge; Dissolve 1 lozenge in the mouth As Needed for Smoking Cessation. Do not use more than 12 per day  Dispense: 48 lozenge; Refill: 5    4. Moderate persistent asthma without complication  Assessment & Plan:  -Chronic stable continue follow-up with allergy asthma Dupixent, Flovent, albuterol  -Smoking cessation discussed           I have educated the patient on the risk of diseases from using tobacco products including lung disease, cancers, and vascular disease. I advised the patient to quit. Smoking cessation handout provided.  We discussed cessation options including cold turkey, taper, nicotine replacement versus prescription options.  Patient quit date is  April 1 .  Patient will return in less than 6 months to evaluate progress. I spent 3-10 minutes counseling the patient.      Follow Up:   Return in about 3 months (around 4/26/2023) for Follow-up.    Healthcare Maintenance:   Health maintenance counseling included discussion of healthy diet and physical " activity, Dental hygiene, and vaccinations.  Thompson Dye voices understanding and acceptance of this advice and will call back with any further questions or concerns. AVS with preventive healthcare tips printed for patient.     Andrew Edwards MD  Family Medicine - [facility]

## 2023-01-26 NOTE — ASSESSMENT & PLAN NOTE
-Chronic stable continue follow-up with allergy asthma Dupixent, Flovent, albuterol  -Smoking cessation discussed

## 2023-01-26 NOTE — PATIENT INSTRUCTIONS
Preventive Care 21-39 Years Old, Male  Preventive care refers to lifestyle choices and visits with your health care provider that can promote health and wellness. Preventive care visits are also called wellness exams.  What can I expect for my preventive care visit?  Counseling  During your preventive care visit, your health care provider may ask about your:  Medical history, including:  Past medical problems.  Family medical history.  Current health, including:  Emotional well-being.  Home life and relationship well-being.  Sexual activity.  Lifestyle, including:  Alcohol, nicotine or tobacco, and drug use.  Access to firearms.  Diet, exercise, and sleep habits.  Safety issues such as seatbelt and bike helmet use.  Sunscreen use.  Work and work environment.  Physical exam  Your health care provider may check your:  Height and weight. These may be used to calculate your BMI (body mass index). BMI is a measurement that tells if you are at a healthy weight.  Waist circumference. This measures the distance around your waistline. This measurement also tells if you are at a healthy weight and may help predict your risk of certain diseases, such as type 2 diabetes and high blood pressure.  Heart rate and blood pressure.  Body temperature.  Skin for abnormal spots.  What immunizations do I need?  Vaccines are usually given at various ages, according to a schedule. Your health care provider will recommend vaccines for you based on your age, medical history, and lifestyle or other factors, such as travel or where you work.  What tests do I need?  Screening  Your health care provider may recommend screening tests for certain conditions. This may include:  Lipid and cholesterol levels.  Diabetes screening. This is done by checking your blood sugar (glucose) after you have not eaten for a while (fasting).  Hepatitis B test.  Hepatitis C test.  HIV (human immunodeficiency virus) test.  STI (sexually transmitted infection)  testing, if you are at risk.  Talk with your health care provider about your test results, treatment options, and if necessary, the need for more tests.  Follow these instructions at home:  Eating and drinking    Eat a healthy diet that includes fresh fruits and vegetables, whole grains, lean protein, and low-fat dairy products.  Drink enough fluid to keep your urine pale yellow.  Take vitamin and mineral supplements as recommended by your health care provider.  Do not drink alcohol if your health care provider tells you not to drink.  If you drink alcohol:  Limit how much you have to 0-2 drinks a day.  Know how much alcohol is in your drink. In the [place] one drink equals one 12 oz bottle of beer (355 mL), one 5 oz glass of wine (148 mL), or one 1½ oz glass of hard liquor (44 mL).  Lifestyle  Brush your teeth every morning and night with fluoride toothpaste. Floss one time each day.  Exercise for at least 30 minutes 5 or more days each week.  Do not use any products that contain nicotine or tobacco. These products include cigarettes, chewing tobacco, and vaping devices, such as e-cigarettes. If you need help quitting, ask your health care provider.  Do not use drugs.  If you are sexually active, practice safe sex. Use a condom or other form of protection to prevent STIs.  Find healthy ways to manage stress, such as:  Meditation, yoga, or listening to music.  Journaling.  Talking to a trusted person.  Spending time with friends and family.  Minimize exposure to UV radiation to reduce your risk of skin cancer.  Safety  Always wear your seat belt while driving or riding in a vehicle.  Do not drive:  If you have been drinking alcohol. Do not ride with someone who has been drinking.  If you have been using any mind-altering substances or drugs.  While texting.  When you are tired or distracted.  Wear a helmet and other protective equipment during sports activities.  If you have firearms in your house, make sure you  follow all gun safety procedures.  Seek help if you have been physically or sexually abused.  What's next?  Go to your health care provider once a year for an annual wellness visit.  Ask your health care provider how often you should have your eyes and teeth checked.  Stay up to date on all vaccines.  This information is not intended to replace advice given to you by your health care provider. Make sure you discuss any questions you have with your health care provider.  Document Revised: 06/15/2022 Document Reviewed: 06/15/2022  Elsevier Patient Education © 2022 [employer]

## 2023-01-28 PROBLEM — R73.03 PREDIABETES: Status: ACTIVE | Noted: 2023-01-28

## 2023-04-26 ENCOUNTER — OFFICE VISIT (OUTPATIENT)
Dept: FAMILY MEDICINE CLINIC | Facility: CLINIC | Age: 38
End: 2023-04-26
Payer: MEDICAID

## 2023-04-26 VITALS
HEIGHT: 76 IN | TEMPERATURE: 98.6 F | WEIGHT: 257.6 LBS | BODY MASS INDEX: 31.37 KG/M2 | OXYGEN SATURATION: 97 % | SYSTOLIC BLOOD PRESSURE: 130 MMHG | HEART RATE: 82 BPM | DIASTOLIC BLOOD PRESSURE: 88 MMHG | RESPIRATION RATE: 16 BRPM

## 2023-04-26 DIAGNOSIS — K22.11 ULCER OF ESOPHAGUS WITH BLEEDING: ICD-10-CM

## 2023-04-26 DIAGNOSIS — R73.03 PREDIABETES: ICD-10-CM

## 2023-04-26 DIAGNOSIS — E78.49 OTHER HYPERLIPIDEMIA: ICD-10-CM

## 2023-04-26 DIAGNOSIS — F17.200 NICOTINE DEPENDENCE, UNCOMPLICATED, UNSPECIFIED NICOTINE PRODUCT TYPE: ICD-10-CM

## 2023-04-26 RX ORDER — PANTOPRAZOLE SODIUM 40 MG/1
40 TABLET, DELAYED RELEASE ORAL DAILY
Qty: 90 TABLET | Refills: 3 | Status: SHIPPED | OUTPATIENT
Start: 2023-04-26

## 2023-04-26 RX ORDER — ROSUVASTATIN CALCIUM 10 MG/1
10 TABLET, COATED ORAL DAILY
Qty: 90 TABLET | Refills: 3 | Status: SHIPPED | OUTPATIENT
Start: 2023-04-26

## 2023-04-26 RX ORDER — VARENICLINE TARTRATE 0.5 MG/1
TABLET, FILM COATED ORAL
Qty: 11 TABLET | Refills: 0 | Status: SHIPPED | OUTPATIENT
Start: 2023-04-26

## 2023-04-26 RX ORDER — IBUPROFEN 800 MG/1
TABLET ORAL
COMMUNITY
Start: 2023-01-23

## 2023-04-26 RX ORDER — VARENICLINE TARTRATE 1 MG/1
1 TABLET, FILM COATED ORAL 2 TIMES DAILY
Qty: 60 TABLET | Refills: 5 | Status: SHIPPED | OUTPATIENT
Start: 2023-04-26

## 2023-04-26 NOTE — PROGRESS NOTES
Established Patient Office Visit        Subjective      Chief Complaint:  Asthma (Asthma follow up, 3 months, needing a refill of protonix)      History of Present Illness: Thompson Dye is a 37 y.o. male who presents for smoking and esophageal ulcer.     No stomach pain no blood in stool     He is improving on his smoking      Patient Active Problem List   Diagnosis   • Acid reflux   • Apple's syndrome   • Acne cystica   • AD (atopic dermatitis)   • Nicotine addiction   • Other hyperlipidemia   • Ulcer of esophagus with bleeding   • Esophagitis   • Moderate persistent asthma without complication   • Prediabetes         Current Outpatient Medications:   •  albuterol sulfate  (90 Base) MCG/ACT inhaler, Inhale 2 puffs Every 4 (Four) Hours As Needed for Wheezing., Disp: 1 inhaler, Rfl: 11  •  Dupixent 300 MG/2ML solution pen-injector, , Disp: , Rfl:   •  Flovent  MCG/ACT inhaler, , Disp: , Rfl:   •  nicotine (NICODERM CQ) 14 MG/24HR patch, Place 1 patch on the skin as directed by provider Daily., Disp: 28 each, Rfl: 1  •  nicotine polacrilex ([facility] Nicotine) 2 MG lozenge, Dissolve 1 lozenge in the mouth As Needed for Smoking Cessation. Do not use more than 12 per day, Disp: 48 lozenge, Rfl: 5  •  pantoprazole (PROTONIX) 40 MG EC tablet, Take 1 tablet by mouth Daily., Disp: 90 tablet, Rfl: 3  •  ibuprofen (ADVIL,MOTRIN) 800 MG tablet, , Disp: , Rfl:   •  methocarbamol (ROBAXIN) 750 MG tablet, Take 1 tablet by mouth 2 (Two) Times a Day As Needed for Muscle Spasms., Disp: 30 tablet, Rfl: 0  •  nicotine (NICODERM CQ) 7 MG/24HR patch, Place 1 patch on the skin as directed by provider Daily. Start after 14 mg patches (Patient not taking: Reported on 4/26/2023), Disp: 28 each, Rfl: 1  •  rosuvastatin (Crestor) 10 MG tablet, Take 1 tablet by mouth Daily., Disp: 90 tablet, Rfl: 3  •  varenicline (Chantix) 0.5 MG tablet, Day 1-3: take one table daily. Day 4-7: take one tablet twice daily, Disp: 11 tablet, Rfl:  "0  •  varenicline (CHANTIX) 1 MG tablet, Take 1 tablet by mouth 2 (Two) Times a Day. Start after 0.5 dose, Disp: 60 tablet, Rfl: 5       Objective     Physical Exam:   Vital Signs:   /88 (BP Location: Left arm, Patient Position: Sitting, Cuff Size: Adult)   Pulse 82   Temp 98.6 °F (37 °C) (Temporal)   Resp 16   Ht 193 cm (76\")   Wt 117 kg (257 lb 9.6 oz)   SpO2 97%   BMI 31.36 kg/m²      Physical Exam  Constitutional:       General: He is not in acute distress.     Appearance: He is not ill-appearing.   Cardiovascular:      Rate and Rhythm: Normal rate and regular rhythm.   Pulmonary:      Effort: Pulmonary effort is normal.      Breath sounds: Normal breath sounds.   Neurological:      Mental Status: He is alert.   Psychiatric:         Thought Content: Thought content normal.            Assessment / Plan      Assessment/Plan:   Diagnoses and all orders for this visit:    1. Other hyperlipidemia  Assessment & Plan:  Start statin given LDL in the 180s and history of smoking discussed healthy diet    Orders:  -     rosuvastatin (Crestor) 10 MG tablet; Take 1 tablet by mouth Daily.  Dispense: 90 tablet; Refill: 3    2. Prediabetes  Assessment & Plan:  Discussed healthy diet      3. Nicotine dependence, uncomplicated, unspecified nicotine product type  Assessment & Plan:  I have educated the patient on the risk of diseases from using tobacco products including lung disease, cancers, and vascular disease. I advised the patient to quit. Smoking cessation handout provided.  We discussed cessation options including cold turkey, taper, nicotine replacement versus prescription options.  Patient quit date is  May 31st.  Patient will return in less than 6 months to evaluate progress. I spent 3-10 minutes counseling the patient. Start chantix discussed risk continue patches.       Orders:  -     varenicline (Chantix) 0.5 MG tablet; Day 1-3: take one table daily. Day 4-7: take one tablet twice daily  Dispense: 11 " tablet; Refill: 0  -     varenicline (CHANTIX) 1 MG tablet; Take 1 tablet by mouth 2 (Two) Times a Day. Start after 0.5 dose  Dispense: 60 tablet; Refill: 5    4. Ulcer of esophagus with bleeding  Overview:  Seen via EGD 09/2022 repeat EGD around December 2022 reassuring.  Follow-up with GI recommended December 2023.  [facility]. Continue PPI daily     Orders:  -     pantoprazole (PROTONIX) 40 MG EC tablet; Take 1 tablet by mouth Daily.  Dispense: 90 tablet; Refill: 3        Follow Up:   Return in about 3 months (around 7/26/2023) for Follow-up.      MDM:     Anrdew Edwards MD  [facility] - [facility]

## 2023-04-26 NOTE — ASSESSMENT & PLAN NOTE
I have educated the patient on the risk of diseases from using tobacco products including lung disease, cancers, and vascular disease. I advised the patient to quit. Smoking cessation handout provided.  We discussed cessation options including cold turkey, taper, nicotine replacement versus prescription options.  Patient quit date is  May 31st.  Patient will return in less than 6 months to evaluate progress. I spent 3-10 minutes counseling the patient. Start chantix discussed risk continue patches.

## 2023-08-09 ENCOUNTER — OFFICE VISIT (OUTPATIENT)
Dept: FAMILY MEDICINE CLINIC | Facility: CLINIC | Age: 38
End: 2023-08-09

## 2023-08-09 ENCOUNTER — LAB (OUTPATIENT)
Dept: LAB | Facility: HOSPITAL | Age: 38
End: 2023-08-09

## 2023-08-09 VITALS
OXYGEN SATURATION: 98 % | SYSTOLIC BLOOD PRESSURE: 130 MMHG | HEIGHT: 76 IN | DIASTOLIC BLOOD PRESSURE: 86 MMHG | RESPIRATION RATE: 21 BRPM | TEMPERATURE: 98.6 F | HEART RATE: 62 BPM | WEIGHT: 250 LBS | BODY MASS INDEX: 30.44 KG/M2

## 2023-08-09 DIAGNOSIS — E78.49 OTHER HYPERLIPIDEMIA: ICD-10-CM

## 2023-08-09 DIAGNOSIS — K22.11 ULCER OF ESOPHAGUS WITH BLEEDING: ICD-10-CM

## 2023-08-09 DIAGNOSIS — R73.03 PREDIABETES: Primary | ICD-10-CM

## 2023-08-09 DIAGNOSIS — F17.200 NICOTINE DEPENDENCE, UNCOMPLICATED, UNSPECIFIED NICOTINE PRODUCT TYPE: ICD-10-CM

## 2023-08-09 DIAGNOSIS — R73.03 PREDIABETES: ICD-10-CM

## 2023-08-09 LAB
ALBUMIN SERPL-MCNC: 4.5 G/DL (ref 3.5–5.2)
ALBUMIN/GLOB SERPL: 1.8 G/DL
ALP SERPL-CCNC: 68 U/L (ref 39–117)
ALT SERPL W P-5'-P-CCNC: 39 U/L (ref 1–41)
ANION GAP SERPL CALCULATED.3IONS-SCNC: 12.8 MMOL/L (ref 5–15)
AST SERPL-CCNC: 33 U/L (ref 1–40)
BILIRUB SERPL-MCNC: 0.4 MG/DL (ref 0–1.2)
BUN SERPL-MCNC: 12 MG/DL (ref 6–20)
BUN/CREAT SERPL: 11.4 (ref 7–25)
CALCIUM SPEC-SCNC: 9 MG/DL (ref 8.6–10.5)
CHLORIDE SERPL-SCNC: 108 MMOL/L (ref 98–107)
CHOLEST SERPL-MCNC: 227 MG/DL (ref 0–200)
CO2 SERPL-SCNC: 21.2 MMOL/L (ref 22–29)
CREAT SERPL-MCNC: 1.05 MG/DL (ref 0.76–1.27)
EGFRCR SERPLBLD CKD-EPI 2021: 93.2 ML/MIN/1.73
GLOBULIN UR ELPH-MCNC: 2.5 GM/DL
GLUCOSE SERPL-MCNC: 98 MG/DL (ref 65–99)
HBA1C MFR BLD: 5.9 % (ref 4.8–5.6)
HDLC SERPL-MCNC: 39 MG/DL (ref 40–60)
LDLC SERPL CALC-MCNC: 173 MG/DL (ref 0–100)
LDLC/HDLC SERPL: 4.38 {RATIO}
POTASSIUM SERPL-SCNC: 4.4 MMOL/L (ref 3.5–5.2)
PROT SERPL-MCNC: 7 G/DL (ref 6–8.5)
SODIUM SERPL-SCNC: 142 MMOL/L (ref 136–145)
TRIGL SERPL-MCNC: 85 MG/DL (ref 0–150)
VLDLC SERPL-MCNC: 15 MG/DL (ref 5–40)

## 2023-08-09 PROCEDURE — 83036 HEMOGLOBIN GLYCOSYLATED A1C: CPT

## 2023-08-09 PROCEDURE — 99214 OFFICE O/P EST MOD 30 MIN: CPT | Performed by: STUDENT IN AN ORGANIZED HEALTH CARE EDUCATION/TRAINING PROGRAM

## 2023-08-09 PROCEDURE — 80053 COMPREHEN METABOLIC PANEL: CPT

## 2023-08-09 PROCEDURE — 80061 LIPID PANEL: CPT | Performed by: STUDENT IN AN ORGANIZED HEALTH CARE EDUCATION/TRAINING PROGRAM

## 2023-08-09 RX ORDER — PANTOPRAZOLE SODIUM 40 MG/1
40 TABLET, DELAYED RELEASE ORAL DAILY
Qty: 90 TABLET | Refills: 3 | Status: SHIPPED | OUTPATIENT
Start: 2023-08-09

## 2023-08-09 RX ORDER — ROSUVASTATIN CALCIUM 10 MG/1
10 TABLET, COATED ORAL DAILY
Qty: 90 TABLET | Refills: 3 | Status: SHIPPED | OUTPATIENT
Start: 2023-08-09

## 2023-08-09 RX ORDER — VARENICLINE TARTRATE 1 MG/1
1 TABLET, FILM COATED ORAL 2 TIMES DAILY
Qty: 180 TABLET | Refills: 1 | Status: SHIPPED | OUTPATIENT
Start: 2023-08-09

## 2023-08-09 NOTE — PROGRESS NOTES
"  Established Patient Office Visit        Subjective      Chief Complaint:  Peptic Ulcer Disease (Follow up on ulcer, hyperlipidemia, back pain in the morning, needing multiple refills)      History of Present Illness: Thompson Dye is a 38 y.o. male who presents for esophagitis and smoking.     Feeling well. Trying to cut back on smoking. Chantix helping some,     No side effects of statin       Patient Active Problem List   Diagnosis    Acid reflux    Apple's syndrome    Acne cystica    AD (atopic dermatitis)    Nicotine addiction    Other hyperlipidemia    Ulcer of esophagus with bleeding    Esophagitis    Moderate persistent asthma without complication    Prediabetes         Current Outpatient Medications:     albuterol sulfate  (90 Base) MCG/ACT inhaler, Inhale 2 puffs Every 4 (Four) Hours As Needed for Wheezing., Disp: 1 inhaler, Rfl: 11    Flovent  MCG/ACT inhaler, , Disp: , Rfl:     ibuprofen (ADVIL,MOTRIN) 800 MG tablet, , Disp: , Rfl:     nicotine (NICODERM CQ) 14 MG/24HR patch, Place 1 patch on the skin as directed by provider Daily., Disp: 28 each, Rfl: 1    nicotine (NICODERM CQ) 7 MG/24HR patch, Place 1 patch on the skin as directed by provider Daily. Start after 14 mg patches, Disp: 28 each, Rfl: 1    nicotine polacrilex ([facility] Nicotine) 2 MG lozenge, Dissolve 1 lozenge in the mouth As Needed for Smoking Cessation. Do not use more than 12 per day, Disp: 48 lozenge, Rfl: 5    pantoprazole (PROTONIX) 40 MG EC tablet, Take 1 tablet by mouth Daily., Disp: 90 tablet, Rfl: 3    rosuvastatin (Crestor) 10 MG tablet, Take 1 tablet by mouth Daily., Disp: 90 tablet, Rfl: 3    varenicline (CHANTIX) 1 MG tablet, Take 1 tablet by mouth 2 (Two) Times a Day. Start after 0.5 dose, Disp: 180 tablet, Rfl: 1       Objective     Physical Exam:   Vital Signs:   /86 (BP Location: Right arm, Patient Position: Sitting, Cuff Size: Adult)   Pulse 62   Temp 98.6 øF (37 øC)   Resp 21   Ht 193 cm (76\")  "  Wt 113 kg (250 lb)   SpO2 98%   BMI 30.43 kg/mý      Physical Exam  Constitutional:       General: He is not in acute distress.     Appearance: He is not ill-appearing.   Cardiovascular:      Rate and Rhythm: Normal rate and regular rhythm.   Pulmonary:      Effort: Pulmonary effort is normal.      Breath sounds: Normal breath sounds.   Neurological:      Mental Status: He is alert.   Psychiatric:         Thought Content: Thought content normal.            Assessment / Plan      Assessment/Plan:   Diagnoses and all orders for this visit:    1. Prediabetes (Primary)  -     Hemoglobin A1c; Future  - Discussed healthy diet and exercise. Hand out given.       2. Other hyperlipidemia  -     rosuvastatin (Crestor) 10 MG tablet; Take 1 tablet by mouth Daily.  Dispense: 90 tablet; Refill: 3  -     Comprehensive Metabolic Panel; Future  -     Lipid Panel    3. Ulcer of esophagus with bleeding  Overview:  Seen via EGD 09/2022 repeat EGD around December 2022 reassuring.  Follow-up with GI recommended December 2023.  [facility]. Continue PPI daily     Orders:  -     pantoprazole (PROTONIX) 40 MG EC tablet; Take 1 tablet by mouth Daily.  Dispense: 90 tablet; Refill: 3    4. Nicotine dependence, uncomplicated, unspecified nicotine product type  -     varenicline (CHANTIX) 1 MG tablet; Take 1 tablet by mouth 2 (Two) Times a Day. Start after 0.5 dose  Dispense: 180 tablet; Refill: 1            Follow Up:   Return in about 6 months (around 2/9/2024) for Wellness visit.      MDM:     Andrew Edwards MD  [facility]

## 2025-01-08 ENCOUNTER — LAB (OUTPATIENT)
Dept: INTERNAL MEDICINE | Facility: CLINIC | Age: 40
End: 2025-01-08
Payer: COMMERCIAL

## 2025-01-08 ENCOUNTER — OFFICE VISIT (OUTPATIENT)
Dept: INTERNAL MEDICINE | Facility: CLINIC | Age: 40
End: 2025-01-08
Payer: COMMERCIAL

## 2025-01-08 VITALS
HEART RATE: 80 BPM | HEIGHT: 76 IN | BODY MASS INDEX: 33.85 KG/M2 | DIASTOLIC BLOOD PRESSURE: 84 MMHG | TEMPERATURE: 97.4 F | WEIGHT: 278 LBS | SYSTOLIC BLOOD PRESSURE: 140 MMHG | RESPIRATION RATE: 20 BRPM

## 2025-01-08 DIAGNOSIS — Z00.00 ANNUAL PHYSICAL EXAM: Primary | ICD-10-CM

## 2025-01-08 DIAGNOSIS — I10 BENIGN ESSENTIAL HTN: ICD-10-CM

## 2025-01-08 DIAGNOSIS — J45.20 MILD INTERMITTENT ASTHMA, UNSPECIFIED WHETHER COMPLICATED: ICD-10-CM

## 2025-01-08 DIAGNOSIS — E78.2 MIXED HYPERLIPIDEMIA: ICD-10-CM

## 2025-01-08 DIAGNOSIS — E78.49 OTHER HYPERLIPIDEMIA: ICD-10-CM

## 2025-01-08 DIAGNOSIS — R06.83 SNORING: ICD-10-CM

## 2025-01-08 DIAGNOSIS — Z11.59 ENCOUNTER FOR HEPATITIS C SCREENING TEST FOR LOW RISK PATIENT: ICD-10-CM

## 2025-01-08 DIAGNOSIS — Z12.5 SCREENING FOR PROSTATE CANCER: ICD-10-CM

## 2025-01-08 DIAGNOSIS — E55.9 VITAMIN D DEFICIENCY: ICD-10-CM

## 2025-01-08 DIAGNOSIS — K21.9 GASTROESOPHAGEAL REFLUX DISEASE WITHOUT ESOPHAGITIS: ICD-10-CM

## 2025-01-08 DIAGNOSIS — J45.20 MILD INTERMITTENT ASTHMA WITHOUT COMPLICATION: ICD-10-CM

## 2025-01-08 DIAGNOSIS — Z76.89 ENCOUNTER TO ESTABLISH CARE WITH NEW DOCTOR: ICD-10-CM

## 2025-01-08 LAB
25(OH)D3 SERPL-MCNC: 15.8 NG/ML (ref 30–100)
ALBUMIN SERPL-MCNC: 4.1 G/DL (ref 3.5–5.2)
ALBUMIN/GLOB SERPL: 1.4 G/DL
ALP SERPL-CCNC: 62 U/L (ref 39–117)
ALT SERPL W P-5'-P-CCNC: 68 U/L (ref 1–41)
ANION GAP SERPL CALCULATED.3IONS-SCNC: 12 MMOL/L (ref 5–15)
AST SERPL-CCNC: 46 U/L (ref 1–40)
BACTERIA UR QL AUTO: NORMAL /HPF
BASOPHILS # BLD AUTO: 0.03 10*3/MM3 (ref 0–0.2)
BASOPHILS NFR BLD AUTO: 0.5 % (ref 0–1.5)
BILIRUB SERPL-MCNC: 0.2 MG/DL (ref 0–1.2)
BILIRUB UR QL STRIP: NEGATIVE
BUN SERPL-MCNC: 13 MG/DL (ref 6–20)
BUN/CREAT SERPL: 11.6 (ref 7–25)
CALCIUM SPEC-SCNC: 9.3 MG/DL (ref 8.6–10.5)
CHLORIDE SERPL-SCNC: 105 MMOL/L (ref 98–107)
CHOLEST SERPL-MCNC: 271 MG/DL (ref 0–200)
CLARITY UR: CLEAR
CO2 SERPL-SCNC: 25 MMOL/L (ref 22–29)
COLOR UR: YELLOW
CREAT SERPL-MCNC: 1.12 MG/DL (ref 0.76–1.27)
DEPRECATED RDW RBC AUTO: 44.7 FL (ref 37–54)
EGFRCR SERPLBLD CKD-EPI 2021: 85.7 ML/MIN/1.73
EOSINOPHIL # BLD AUTO: 0.19 10*3/MM3 (ref 0–0.4)
EOSINOPHIL NFR BLD AUTO: 3.1 % (ref 0.3–6.2)
ERYTHROCYTE [DISTWIDTH] IN BLOOD BY AUTOMATED COUNT: 13.1 % (ref 12.3–15.4)
GLOBULIN UR ELPH-MCNC: 3 GM/DL
GLUCOSE SERPL-MCNC: 88 MG/DL (ref 65–99)
GLUCOSE UR STRIP-MCNC: NEGATIVE MG/DL
HBA1C MFR BLD: 6.1 % (ref 4.8–5.6)
HCT VFR BLD AUTO: 49.2 % (ref 37.5–51)
HCV AB SER QL: NORMAL
HDLC SERPL-MCNC: 37 MG/DL (ref 40–60)
HGB BLD-MCNC: 16.2 G/DL (ref 13–17.7)
HGB UR QL STRIP.AUTO: NEGATIVE
HYALINE CASTS UR QL AUTO: NORMAL /LPF
IMM GRANULOCYTES # BLD AUTO: 0.02 10*3/MM3 (ref 0–0.05)
IMM GRANULOCYTES NFR BLD AUTO: 0.3 % (ref 0–0.5)
KETONES UR QL STRIP: NEGATIVE
LDLC SERPL CALC-MCNC: 215 MG/DL (ref 0–100)
LDLC/HDLC SERPL: 5.75 {RATIO}
LEUKOCYTE ESTERASE UR QL STRIP.AUTO: NEGATIVE
LYMPHOCYTES # BLD AUTO: 2.71 10*3/MM3 (ref 0.7–3.1)
LYMPHOCYTES NFR BLD AUTO: 44.7 % (ref 19.6–45.3)
MCH RBC QN AUTO: 30.2 PG (ref 26.6–33)
MCHC RBC AUTO-ENTMCNC: 32.9 G/DL (ref 31.5–35.7)
MCV RBC AUTO: 91.8 FL (ref 79–97)
MONOCYTES # BLD AUTO: 0.73 10*3/MM3 (ref 0.1–0.9)
MONOCYTES NFR BLD AUTO: 12 % (ref 5–12)
NEUTROPHILS NFR BLD AUTO: 2.38 10*3/MM3 (ref 1.7–7)
NEUTROPHILS NFR BLD AUTO: 39.4 % (ref 42.7–76)
NITRITE UR QL STRIP: NEGATIVE
NRBC BLD AUTO-RTO: 0 /100 WBC (ref 0–0.2)
PH UR STRIP.AUTO: 6 [PH] (ref 5–8)
PLATELET # BLD AUTO: 332 10*3/MM3 (ref 140–450)
PMV BLD AUTO: 9.1 FL (ref 6–12)
POTASSIUM SERPL-SCNC: 4.4 MMOL/L (ref 3.5–5.2)
PROT SERPL-MCNC: 7.1 G/DL (ref 6–8.5)
PROT UR QL STRIP: NEGATIVE
PSA SERPL-MCNC: 0.32 NG/ML (ref 0–4)
RBC # BLD AUTO: 5.36 10*6/MM3 (ref 4.14–5.8)
RBC # UR STRIP: NORMAL /HPF
REF LAB TEST METHOD: NORMAL
SODIUM SERPL-SCNC: 142 MMOL/L (ref 136–145)
SP GR UR STRIP: 1.02 (ref 1–1.03)
SQUAMOUS #/AREA URNS HPF: NORMAL /HPF
TRIGL SERPL-MCNC: 107 MG/DL (ref 0–150)
TSH SERPL DL<=0.05 MIU/L-ACNC: 1.69 UIU/ML (ref 0.27–4.2)
UROBILINOGEN UR QL STRIP: NORMAL
VIT B12 BLD-MCNC: 401 PG/ML (ref 211–946)
VLDLC SERPL-MCNC: 19 MG/DL (ref 5–40)
WBC # UR STRIP: NORMAL /HPF
WBC NRBC COR # BLD AUTO: 6.06 10*3/MM3 (ref 3.4–10.8)

## 2025-01-08 PROCEDURE — 99395 PREV VISIT EST AGE 18-39: CPT | Performed by: INTERNAL MEDICINE

## 2025-01-08 PROCEDURE — 83036 HEMOGLOBIN GLYCOSYLATED A1C: CPT | Performed by: INTERNAL MEDICINE

## 2025-01-08 PROCEDURE — 85025 COMPLETE CBC W/AUTO DIFF WBC: CPT | Performed by: INTERNAL MEDICINE

## 2025-01-08 PROCEDURE — G0103 PSA SCREENING: HCPCS | Performed by: INTERNAL MEDICINE

## 2025-01-08 PROCEDURE — 86803 HEPATITIS C AB TEST: CPT | Performed by: INTERNAL MEDICINE

## 2025-01-08 PROCEDURE — 80053 COMPREHEN METABOLIC PANEL: CPT | Performed by: INTERNAL MEDICINE

## 2025-01-08 PROCEDURE — 80061 LIPID PANEL: CPT | Performed by: INTERNAL MEDICINE

## 2025-01-08 PROCEDURE — 82607 VITAMIN B-12: CPT | Performed by: INTERNAL MEDICINE

## 2025-01-08 PROCEDURE — 82306 VITAMIN D 25 HYDROXY: CPT | Performed by: INTERNAL MEDICINE

## 2025-01-08 PROCEDURE — 81001 URINALYSIS AUTO W/SCOPE: CPT | Performed by: INTERNAL MEDICINE

## 2025-01-08 PROCEDURE — 83013 H PYLORI (C-13) BREATH: CPT | Performed by: INTERNAL MEDICINE

## 2025-01-08 PROCEDURE — 36415 COLL VENOUS BLD VENIPUNCTURE: CPT | Performed by: INTERNAL MEDICINE

## 2025-01-08 PROCEDURE — 84443 ASSAY THYROID STIM HORMONE: CPT | Performed by: INTERNAL MEDICINE

## 2025-01-08 PROCEDURE — 99214 OFFICE O/P EST MOD 30 MIN: CPT | Performed by: INTERNAL MEDICINE

## 2025-01-08 RX ORDER — AMLODIPINE BESYLATE 5 MG/1
5 TABLET ORAL DAILY
Qty: 90 TABLET | Refills: 5 | Status: SHIPPED | OUTPATIENT
Start: 2025-01-08

## 2025-01-08 RX ORDER — ROSUVASTATIN CALCIUM 10 MG/1
10 TABLET, COATED ORAL DAILY
Qty: 90 TABLET | Refills: 3 | Status: SHIPPED | OUTPATIENT
Start: 2025-01-08

## 2025-01-08 RX ORDER — PANTOPRAZOLE SODIUM 40 MG/1
40 TABLET, DELAYED RELEASE ORAL DAILY
Qty: 90 TABLET | Refills: 3 | Status: SHIPPED | OUTPATIENT
Start: 2025-01-08

## 2025-01-08 RX ORDER — FLUTICASONE PROPIONATE 110 UG/1
2 AEROSOL, METERED RESPIRATORY (INHALATION)
Qty: 12 G | Refills: 3 | Status: SHIPPED | OUTPATIENT
Start: 2025-01-08

## 2025-01-08 RX ORDER — ALBUTEROL SULFATE 90 UG/1
2 INHALANT RESPIRATORY (INHALATION) EVERY 4 HOURS PRN
Qty: 6.7 G | Refills: 3 | Status: SHIPPED | OUTPATIENT
Start: 2025-01-08

## 2025-01-08 NOTE — PROGRESS NOTES
Male Physical Note      Date: 2025   Patient Name: Thompson Dye  : 1985   MRN: [id number]     Chief Complaint:    Chief Complaint   Patient presents with    Establish Care    Annual Exam    Heartburn     Wants to get back on protonix    Hyperlipidemia       History of Present Illness: Thompson Dye is a 39 y.o. male who is here today for their annual health maintenance and physical.  Here for followup with GERD, HLD, asthma.  GERD uncontrolled.  Stopped smoking recently.      Reports snorings, occasional gasping wakes up from sleep.   Subjective      Review of Systems     I have reviewed the following portions of the patient's history and these were updated and discussed with the patient as appropriate: past family history, past medical history, past social history, past surgical history, and problem list.      Medications:     Current Outpatient Medications:     albuterol sulfate  (90 Base) MCG/ACT inhaler, Inhale 2 puffs Every 4 (Four) Hours As Needed for Wheezing., Disp: 6.7 g, Rfl: 3    fluticasone (Flovent HFA) 110 MCG/ACT inhaler, Inhale 2 puffs 2 (Two) Times a Day. Wash mouth out after use, Disp: 12 g, Rfl: 3    pantoprazole (PROTONIX) 40 MG EC tablet, Take 1 tablet by mouth Daily., Disp: 90 tablet, Rfl: 3    rosuvastatin (Crestor) 10 MG tablet, Take 1 tablet by mouth Daily., Disp: 90 tablet, Rfl: 3    amLODIPine (NORVASC) 5 MG tablet, Take 1 tablet by mouth Daily., Disp: 90 tablet, Rfl: 5    Spacer/Aero-Holding Chambers device, Use 1 each As Needed (with albuterol inhaler)., Disp: 1 each, Rfl: 2    Allergies:   No Known Allergies    Immunizations:  Immunization History   Administered Date(s) Administered    Hepatitis A 2019    Pneumococcal Conjugate 20-Valent (PCV20) 2023    Tdap 2015, 2019, 2020      Colorectal Screening:     Last Completed Colonoscopy       This patient has no relevant Health Maintenance data.              Diet/Physical activity:  poor, avg    Sexual health: active    Sexual Health Inventory for Men (NINA)   Over the past 6 months:     1. How do you rate your confidence that you could get and keep an erection?  5 - Very high    2. When you had erections with sexual     stimulation, how often were your erections hard enough for penetration (entering your partner)?  5 - Almost always or always    3. During sexual intercourse, how often were you able to maintain your erection after you had penetrated (entered) your partner?  5 - Almost always or always    4. During sexual intercourse, how difficult was it to maintain your erection to completion of intercourse?  5 - Not difficult    5. When you attempted sexual intercourse, how often was it satisfactory for you?  5 - Almost always or always     Total Score: 25   The Sexual Health Inventory for Men further classifies ED severity with the following breakpoints:   1-7 (Severe ED) 8-11 (Moderate ED) 12-16 (Mild to Moderate ED) 17-21 (Mild ED)        PHQ-9 Depression Screening  Little interest or pleasure in doing things? Not at all   Feeling down, depressed, or hopeless? Not at all   PHQ-2 Total Score 0   Trouble falling or staying asleep, or sleeping too much?     Feeling tired or having little energy?     Poor appetite or overeating?     Feeling bad about yourself - or that you are a failure or have let yourself or your family down?     Trouble concentrating on things, such as reading the newspaper or watching television?     Moving or speaking so slowly that other people could have noticed? Or the opposite - being so fidgety or restless that you have been moving around a lot more than usual?     Thoughts that you would be better off dead, or of hurting yourself in some way?     PHQ-9 Total Score     If you checked off any problems, how difficult have these problems made it for you to do your work, take care of things at home, or get along with other people? Not difficult at all       ANDRES-7     "    Objective     Physical Exam:  Vital Signs:   Vitals:    01/08/25 1049   BP: 140/84   BP Location: Right arm   Patient Position: Sitting   Cuff Size: Large Adult   Pulse: 80   Resp: 20   Temp: 97.4 °F (36.3 °C)   TempSrc: Tympanic   Weight: 126 kg (278 lb)   Height: 193 cm (76\")     Body mass index is 33.84 kg/m².     Physical Exam  Constitutional:       General: He is not in acute distress.     Appearance: Normal appearance. He is not ill-appearing.   HENT:      Right Ear: Tympanic membrane normal.      Left Ear: Tympanic membrane and ear canal normal.      Nose: No congestion or rhinorrhea.      Mouth/Throat:      Mouth: Mucous membranes are moist.      Pharynx: No oropharyngeal exudate.   Eyes:      Extraocular Movements: Extraocular movements intact.      Conjunctiva/sclera: Conjunctivae normal.      Pupils: Pupils are equal, round, and reactive to light.   Cardiovascular:      Rate and Rhythm: Normal rate and regular rhythm.      Heart sounds: No murmur heard.  Pulmonary:      Effort: Pulmonary effort is normal. No respiratory distress.   Abdominal:      General: Abdomen is flat. Bowel sounds are normal.      Palpations: Abdomen is soft.      Tenderness: There is no abdominal tenderness.   Musculoskeletal:      Right lower leg: No edema.      Left lower leg: No edema.   Skin:     General: Skin is warm and dry.      Findings: No rash.   Neurological:      General: No focal deficit present.      Mental Status: He is alert and oriented to person, place, and time. Mental status is at baseline.   Psychiatric:         Mood and Affect: Mood normal.         Behavior: Behavior normal.         Procedures    LABS:   Lab Results   Component Value Date    HGBA1C 5.90 (H) 08/09/2023     No results found for: \"MICROALBUR\", \"[id number]\"     Assessment / Plan      Assessment/Plan:   Diagnoses and all orders for this visit:    1. Annual physical exam (Primary)  -     Hemoglobin A1c  -     Vitamin B12  -     Lipid Panel  -     " Comprehensive Metabolic Panel  -     CBC & Differential  -     Urinalysis With Microscopic - Urine, Clean Catch  -     TSH Rfx On Abnormal To Free T4    2. Other hyperlipidemia  -     rosuvastatin (Crestor) 10 MG tablet; Take 1 tablet by mouth Daily.  Dispense: 90 tablet; Refill: 3    3. Mild intermittent asthma without complication  -     fluticasone (Flovent HFA) 110 MCG/ACT inhaler; Inhale 2 puffs 2 (Two) Times a Day. Wash mouth out after use  Dispense: 12 g; Refill: 3    4. Mild intermittent asthma, unspecified whether complicated  -     albuterol sulfate  (90 Base) MCG/ACT inhaler; Inhale 2 puffs Every 4 (Four) Hours As Needed for Wheezing.  Dispense: 6.7 g; Refill: 3  -     Spacer/Aero-Holding Chambers device; Use 1 each As Needed (with albuterol inhaler).  Dispense: 1 each; Refill: 2    5. Gastroesophageal reflux disease without esophagitis  -     pantoprazole (PROTONIX) 40 MG EC tablet; Take 1 tablet by mouth Daily.  Dispense: 90 tablet; Refill: 3  -     H. Pylori Breath Test - Breath, Lung    6. Screening for prostate cancer  -     PSA Screen    7. Vitamin D deficiency  -     Vitamin D,25-Hydroxy    8. Encounter for hepatitis C screening test for low risk patient  -     Hepatitis C Antibody    9. Encounter to establish care with new doctor    10. Benign essential HTN  -     amLODIPine (NORVASC) 5 MG tablet; Take 1 tablet by mouth Daily.  Dispense: 90 tablet; Refill: 5    11. Snoring  -     Ambulatory Referral to Sleep Medicine       Cholesterol controlled, but has been out of Crestor, refilled    New diagnosis of hypertension, start amlodipine    Vitamin D deficiency, start vitamin D    Prediabetes worsening, send in nutrition, consider starting metformin      Called with results, start vit d. Send to nutrition. Bp improved on amlodipine    BMI is >= 30 and <35. (Class 1 Obesity). The following options were offered after discussion;: weight loss educational material (shared in after visit summary)        Follow Up:   Return in about 4 weeks (around 2/5/2025) for Annual physical, Recheck.    Healthcare Maintenance:   Counseling provided on exercise, nutrition, age-appropriate screening test, and appropriate lab studies.   Thompson Dye voices understanding and acceptance of this advice and will call back with any further questions or concerns. AVS with preventive healthcare tips printed for patient.     Reviewed the following with the patient: [employer] educational material given to patient.      DO LAILA Thayer

## 2025-01-10 LAB — UREA BREATH TEST QL: NEGATIVE

## 2025-01-13 DIAGNOSIS — E55.9 VITAMIN D DEFICIENCY: Primary | ICD-10-CM

## 2025-01-13 DIAGNOSIS — R73.03 PREDIABETES: ICD-10-CM

## 2025-01-13 RX ORDER — CHOLECALCIFEROL (VITAMIN D3) 50 MCG
2000 TABLET ORAL DAILY
Qty: 90 TABLET | Refills: 5 | Status: SHIPPED | OUTPATIENT
Start: 2025-01-13

## 2025-02-24 ENCOUNTER — OFFICE VISIT (OUTPATIENT)
Dept: INTERNAL MEDICINE | Facility: CLINIC | Age: 40
End: 2025-02-24
Payer: COMMERCIAL

## 2025-02-24 VITALS
TEMPERATURE: 97.5 F | SYSTOLIC BLOOD PRESSURE: 136 MMHG | OXYGEN SATURATION: 97 % | HEIGHT: 76 IN | BODY MASS INDEX: 34.58 KG/M2 | WEIGHT: 284 LBS | HEART RATE: 76 BPM | DIASTOLIC BLOOD PRESSURE: 76 MMHG | RESPIRATION RATE: 20 BRPM

## 2025-02-24 DIAGNOSIS — E78.2 MIXED HYPERLIPIDEMIA: Primary | ICD-10-CM

## 2025-02-24 DIAGNOSIS — I10 BENIGN ESSENTIAL HTN: ICD-10-CM

## 2025-02-24 DIAGNOSIS — R74.8 ELEVATED LIVER ENZYMES: ICD-10-CM

## 2025-02-24 PROCEDURE — 99214 OFFICE O/P EST MOD 30 MIN: CPT | Performed by: INTERNAL MEDICINE

## 2025-02-24 RX ORDER — AMLODIPINE BESYLATE 10 MG/1
10 TABLET ORAL DAILY
Qty: 90 TABLET | Refills: 3 | Status: SHIPPED | OUTPATIENT
Start: 2025-02-24

## 2025-02-24 NOTE — PROGRESS NOTES
Follow Up Office Visit      Date: 2025   Patient Name: Thompson Dye  : 1985   MRN: [id number]     Chief Complaint:    Chief Complaint   Patient presents with    Hypertension    Hyperlipidemia       History of Present Illness: Thompson Dye is a 39 y.o. male who is here today to follow up with HTN, HLD, prediabetes. Has not been checking blood pressure.       Subjective      Past Medical History:   Diagnosis Date    Allergic     Asthma     GERD (gastroesophageal reflux disease)     Headache     History of medical problems     Eczema    Prediabetes        History reviewed. No pertinent surgical history.    Family History   Problem Relation Age of Onset    Hyperlipidemia Mother     Arthritis Mother     Hypertension Mother     Thyroid disease Sister         Social History     Socioeconomic History    Marital status: Single   Tobacco Use    Smoking status: Every Day     Current packs/day: 0.50     Average packs/day: 0.5 packs/day for 22.1 years (11.1 ttl pk-yrs)     Types: Cigarettes     Start date: 2003     Passive exposure: Never    Smokeless tobacco: Never    Tobacco comments:     Sia slowed down significantly   Vaping Use    Vaping status: Never Used   Substance and Sexual Activity    Alcohol use: Yes     Alcohol/week: 1.0 standard drink of alcohol     Types: 1 Drinks containing 0.5 oz of alcohol per week    Drug use: Yes     Types: Marijuana    Sexual activity: Yes     Partners: Female         I have reviewed the patients family history, social history, past medical history, past surgical history and have updated it as appropriate.     Medications:     Current Outpatient Medications:     albuterol sulfate  (90 Base) MCG/ACT inhaler, Inhale 2 puffs Every 4 (Four) Hours As Needed for Wheezing., Disp: 6.7 g, Rfl: 3    Cholecalciferol (Vitamin D) 50 MCG ( UT) tablet, Take 1 tablet by mouth Daily., Disp: 90 tablet, Rfl: 5    fluticasone (Flovent HFA) 110 MCG/ACT inhaler, Inhale 2  "puffs 2 (Two) Times a Day. Wash mouth out after use, Disp: 12 g, Rfl: 3    pantoprazole (PROTONIX) 40 MG EC tablet, Take 1 tablet by mouth Daily., Disp: 90 tablet, Rfl: 3    rosuvastatin (Crestor) 10 MG tablet, Take 1 tablet by mouth Daily., Disp: 90 tablet, Rfl: 3    Spacer/Aero-Holding Chambers device, Use 1 each As Needed (with albuterol inhaler)., Disp: 1 each, Rfl: 2    amLODIPine (NORVASC) 10 MG tablet, Take 1 tablet by mouth Daily., Disp: 90 tablet, Rfl: 3    Allergies:   No Known Allergies    Objective       Vital Signs:   Vitals:    02/24/25 1245   BP: 136/76   BP Location: Left arm   Patient Position: Sitting   Cuff Size: Large Adult   Pulse: 76   Resp: 20   Temp: 97.5 °F (36.4 °C)   TempSrc: Tympanic   SpO2: 97%   Weight: 129 kg (284 lb)   Height: 193 cm (76\")     Body mass index is 34.57 kg/m².    Physical Exam:  Physical Exam  Constitutional:       General: He is not in acute distress.     Appearance: Normal appearance. He is not ill-appearing.   HENT:      Right Ear: Tympanic membrane normal.      Left Ear: Tympanic membrane and ear canal normal.      Nose: No congestion or rhinorrhea.      Mouth/Throat:      Mouth: Mucous membranes are moist.      Pharynx: No oropharyngeal exudate.   Eyes:      Extraocular Movements: Extraocular movements intact.      Conjunctiva/sclera: Conjunctivae normal.      Pupils: Pupils are equal, round, and reactive to light.   Cardiovascular:      Rate and Rhythm: Normal rate and regular rhythm.      Heart sounds: No murmur heard.  Pulmonary:      Effort: Pulmonary effort is normal. No respiratory distress.   Abdominal:      General: Abdomen is flat. Bowel sounds are normal.      Palpations: Abdomen is soft.      Tenderness: There is no abdominal tenderness.   Musculoskeletal:      Right lower leg: No edema.      Left lower leg: No edema.   Skin:     General: Skin is warm and dry.      Findings: No rash.   Neurological:      General: No focal deficit present.      Mental " Status: He is alert and oriented to person, place, and time. Mental status is at baseline.   Psychiatric:         Mood and Affect: Mood normal.         Behavior: Behavior normal.         Procedures    Results:       Assessment / Plan      Assessment/Plan:   Diagnoses and all orders for this visit:    1. Mixed hyperlipidemia (Primary)  -     Lipid Panel    2. Elevated liver enzymes  -     Comprehensive metabolic panel    3. Benign essential HTN  -     amLODIPine (NORVASC) 10 MG tablet; Take 1 tablet by mouth Daily.  Dispense: 90 tablet; Refill: 3       Lipids elevated, repeat now that back on crestor,    Recheck liver enzymes    HTN, better but still uncontrolled, increase amlodipdine, may need hctz in addition.           Follow Up:   No follow-ups on file.    DO LAILA Thayer

## 2025-02-28 ENCOUNTER — LAB (OUTPATIENT)
Dept: INTERNAL MEDICINE | Facility: CLINIC | Age: 40
End: 2025-02-28
Payer: COMMERCIAL

## 2025-02-28 LAB
ALBUMIN SERPL-MCNC: 4.1 G/DL (ref 3.5–5.2)
ALBUMIN/GLOB SERPL: 1.2 G/DL
ALP SERPL-CCNC: 67 U/L (ref 39–117)
ALT SERPL W P-5'-P-CCNC: 46 U/L (ref 1–41)
ANION GAP SERPL CALCULATED.3IONS-SCNC: 11.6 MMOL/L (ref 5–15)
AST SERPL-CCNC: 32 U/L (ref 1–40)
BILIRUB SERPL-MCNC: 0.3 MG/DL (ref 0–1.2)
BUN SERPL-MCNC: 13 MG/DL (ref 6–20)
BUN/CREAT SERPL: 10.3 (ref 7–25)
CALCIUM SPEC-SCNC: 9.5 MG/DL (ref 8.6–10.5)
CHLORIDE SERPL-SCNC: 107 MMOL/L (ref 98–107)
CHOLEST SERPL-MCNC: 169 MG/DL (ref 0–200)
CO2 SERPL-SCNC: 26.4 MMOL/L (ref 22–29)
CREAT SERPL-MCNC: 1.26 MG/DL (ref 0.76–1.27)
EGFRCR SERPLBLD CKD-EPI 2021: 74.4 ML/MIN/1.73
GLOBULIN UR ELPH-MCNC: 3.4 GM/DL
GLUCOSE SERPL-MCNC: 101 MG/DL (ref 65–99)
HDLC SERPL-MCNC: 37 MG/DL (ref 40–60)
LDLC SERPL CALC-MCNC: 119 MG/DL (ref 0–100)
LDLC/HDLC SERPL: 3.2 {RATIO}
POTASSIUM SERPL-SCNC: 4.2 MMOL/L (ref 3.5–5.2)
PROT SERPL-MCNC: 7.5 G/DL (ref 6–8.5)
SODIUM SERPL-SCNC: 145 MMOL/L (ref 136–145)
TRIGL SERPL-MCNC: 68 MG/DL (ref 0–150)
VLDLC SERPL-MCNC: 13 MG/DL (ref 5–40)

## 2025-02-28 PROCEDURE — 80053 COMPREHEN METABOLIC PANEL: CPT | Performed by: INTERNAL MEDICINE

## 2025-02-28 PROCEDURE — 80061 LIPID PANEL: CPT | Performed by: INTERNAL MEDICINE

## 2025-03-01 DIAGNOSIS — E78.2 MIXED HYPERLIPIDEMIA: Primary | ICD-10-CM

## 2025-03-01 RX ORDER — EZETIMIBE 10 MG/1
10 TABLET ORAL DAILY
Qty: 90 TABLET | Refills: 3 | Status: SHIPPED | OUTPATIENT
Start: 2025-03-01

## 2025-03-05 ENCOUNTER — TELEPHONE (OUTPATIENT)
Dept: INTERNAL MEDICINE | Facility: CLINIC | Age: 40
End: 2025-03-05
Payer: COMMERCIAL

## 2025-03-05 NOTE — TELEPHONE ENCOUNTER
Caller: Thompson Dye    Relationship: Self    Best call back number:  [phone] (Mobile)     Which medication are you concerned about: rosuvastatin (Crestor) 10 MG tablet     Who prescribed you this medication:  DR DOMINIQUE     When did you start taking this medication:  AT LEAST 2 MONTHS     What are your concerns: PATIENT SAID HE KNOWS THE MEDICATION CAN CAUSE SWELLING IN THE LEGS AS A SIDE AFFECT BUT  HIS LEGS GETS WARM SENSATION IN THEM MOSTLY IN THE THIGH AREA     How long have you had these concerns: AT LEAST 2 WEEKS     PLEASE CALL AND ADVISE ABOUT THIS     Called and advised Co Q 10, would not recommend stopping statin sec to his LDL level

## 2025-04-02 ENCOUNTER — TELEPHONE (OUTPATIENT)
Dept: PULMONOLOGY | Facility: CLINIC | Age: 40
End: 2025-04-02
Payer: COMMERCIAL

## 2025-04-02 NOTE — TELEPHONE ENCOUNTER
Reached out  to patient regarding NO CALL/NO SHOW for their appointment today. Offered to reschedule patient, and advised of No Call No Show Policy.    LVM, MAILED NS YAIMA - JODIE 4/3     *WARNING 1*

## 2025-06-27 DIAGNOSIS — J45.20 MILD INTERMITTENT ASTHMA WITHOUT COMPLICATION: ICD-10-CM

## 2025-06-27 RX ORDER — FLUTICASONE PROPIONATE 110 UG/1
2 AEROSOL, METERED RESPIRATORY (INHALATION)
Qty: 12 EACH | Refills: 3 | Status: SHIPPED | OUTPATIENT
Start: 2025-06-27

## 2025-08-27 ENCOUNTER — OFFICE VISIT (OUTPATIENT)
Dept: INTERNAL MEDICINE | Age: 40
End: 2025-08-27
Payer: COMMERCIAL

## 2025-08-27 ENCOUNTER — LAB (OUTPATIENT)
Dept: INTERNAL MEDICINE | Age: 40
End: 2025-08-27
Payer: COMMERCIAL

## 2025-08-27 VITALS
WEIGHT: 279 LBS | DIASTOLIC BLOOD PRESSURE: 74 MMHG | HEART RATE: 81 BPM | BODY MASS INDEX: 33.97 KG/M2 | OXYGEN SATURATION: 96 % | HEIGHT: 76 IN | TEMPERATURE: 99 F | SYSTOLIC BLOOD PRESSURE: 118 MMHG

## 2025-08-27 DIAGNOSIS — R73.03 PREDIABETES: ICD-10-CM

## 2025-08-27 DIAGNOSIS — E78.2 MIXED HYPERLIPIDEMIA: Primary | ICD-10-CM

## 2025-08-27 DIAGNOSIS — E11.65 TYPE 2 DIABETES MELLITUS WITH HYPERGLYCEMIA, WITHOUT LONG-TERM CURRENT USE OF INSULIN: ICD-10-CM

## 2025-08-27 DIAGNOSIS — Z00.00 HEALTHCARE MAINTENANCE: Primary | ICD-10-CM

## 2025-08-27 DIAGNOSIS — J45.40 MODERATE PERSISTENT ASTHMA WITHOUT COMPLICATION: ICD-10-CM

## 2025-08-27 DIAGNOSIS — I10 BENIGN ESSENTIAL HTN: ICD-10-CM

## 2025-08-27 DIAGNOSIS — E55.9 VITAMIN D DEFICIENCY: ICD-10-CM

## 2025-08-27 PROCEDURE — 99214 OFFICE O/P EST MOD 30 MIN: CPT | Performed by: INTERNAL MEDICINE

## 2025-08-27 PROCEDURE — 80061 LIPID PANEL: CPT | Performed by: INTERNAL MEDICINE

## 2025-08-27 PROCEDURE — 83036 HEMOGLOBIN GLYCOSYLATED A1C: CPT | Performed by: INTERNAL MEDICINE

## 2025-08-27 PROCEDURE — 82306 VITAMIN D 25 HYDROXY: CPT | Performed by: INTERNAL MEDICINE

## 2025-08-27 PROCEDURE — 36415 COLL VENOUS BLD VENIPUNCTURE: CPT | Performed by: INTERNAL MEDICINE

## 2025-08-27 RX ORDER — AMLODIPINE BESYLATE 5 MG/1
5 TABLET ORAL DAILY
Start: 2025-08-27

## 2025-08-28 LAB
25(OH)D3 SERPL-MCNC: 36.1 NG/ML (ref 30–100)
CHOLEST SERPL-MCNC: 136 MG/DL (ref 0–200)
HBA1C MFR BLD: 6.5 % (ref 4.8–5.6)
HDLC SERPL-MCNC: 40 MG/DL (ref 40–60)
LDLC SERPL CALC-MCNC: 78 MG/DL (ref 0–100)
LDLC/HDLC SERPL: 1.94 {RATIO}
TRIGL SERPL-MCNC: 93 MG/DL (ref 0–150)
VLDLC SERPL-MCNC: 18 MG/DL (ref 5–40)

## 2025-08-29 DIAGNOSIS — E11.65 TYPE 2 DIABETES MELLITUS WITH HYPERGLYCEMIA, WITHOUT LONG-TERM CURRENT USE OF INSULIN: Primary | ICD-10-CM

## 2025-08-29 DIAGNOSIS — E34.9 ENDOCRINE DISORDER: ICD-10-CM

## 2025-08-29 DIAGNOSIS — Z13.1 ENCOUNTER FOR SCREENING FOR DIABETES MELLITUS: ICD-10-CM
